# Patient Record
Sex: FEMALE | Race: WHITE
[De-identification: names, ages, dates, MRNs, and addresses within clinical notes are randomized per-mention and may not be internally consistent; named-entity substitution may affect disease eponyms.]

---

## 2017-03-27 ENCOUNTER — HOSPITAL ENCOUNTER (OUTPATIENT)
Dept: HOSPITAL 83 - WOUNDCARE | Age: 47
End: 2017-03-27
Attending: INTERNAL MEDICINE
Payer: MEDICARE

## 2017-03-27 DIAGNOSIS — F32.9: ICD-10-CM

## 2017-03-27 DIAGNOSIS — F41.9: ICD-10-CM

## 2017-03-27 DIAGNOSIS — Q05.9: ICD-10-CM

## 2017-03-27 DIAGNOSIS — Z86.73: ICD-10-CM

## 2017-03-27 DIAGNOSIS — N18.9: ICD-10-CM

## 2017-03-27 DIAGNOSIS — E78.5: ICD-10-CM

## 2017-03-27 DIAGNOSIS — M19.90: ICD-10-CM

## 2017-03-27 DIAGNOSIS — L89.313: ICD-10-CM

## 2017-03-27 DIAGNOSIS — F17.210: ICD-10-CM

## 2017-03-27 DIAGNOSIS — K21.9: ICD-10-CM

## 2017-03-27 DIAGNOSIS — Z99.2: ICD-10-CM

## 2017-03-27 DIAGNOSIS — Z86.718: ICD-10-CM

## 2017-03-27 DIAGNOSIS — L89.323: Primary | ICD-10-CM

## 2017-03-31 ENCOUNTER — HOSPITAL ENCOUNTER (INPATIENT)
Dept: HOSPITAL 83 - ED | Age: 47
LOS: 2 days | Discharge: HOME | DRG: 592 | End: 2017-04-02
Attending: INTERNAL MEDICINE | Admitting: INTERNAL MEDICINE
Payer: MEDICARE

## 2017-03-31 VITALS — DIASTOLIC BLOOD PRESSURE: 71 MMHG | SYSTOLIC BLOOD PRESSURE: 108 MMHG

## 2017-03-31 VITALS — SYSTOLIC BLOOD PRESSURE: 138 MMHG | DIASTOLIC BLOOD PRESSURE: 73 MMHG

## 2017-03-31 VITALS — BODY MASS INDEX: 16.95 KG/M2 | WEIGHT: 73.25 LBS | HEIGHT: 55 IN

## 2017-03-31 VITALS — DIASTOLIC BLOOD PRESSURE: 72 MMHG | SYSTOLIC BLOOD PRESSURE: 122 MMHG

## 2017-03-31 VITALS — DIASTOLIC BLOOD PRESSURE: 73 MMHG

## 2017-03-31 DIAGNOSIS — Z84.89: ICD-10-CM

## 2017-03-31 DIAGNOSIS — E83.39: ICD-10-CM

## 2017-03-31 DIAGNOSIS — Z87.440: ICD-10-CM

## 2017-03-31 DIAGNOSIS — Z86.718: ICD-10-CM

## 2017-03-31 DIAGNOSIS — K21.9: ICD-10-CM

## 2017-03-31 DIAGNOSIS — Z86.73: ICD-10-CM

## 2017-03-31 DIAGNOSIS — M54.9: ICD-10-CM

## 2017-03-31 DIAGNOSIS — E87.5: ICD-10-CM

## 2017-03-31 DIAGNOSIS — D63.1: ICD-10-CM

## 2017-03-31 DIAGNOSIS — Z90.49: ICD-10-CM

## 2017-03-31 DIAGNOSIS — Z82.49: ICD-10-CM

## 2017-03-31 DIAGNOSIS — G89.29: ICD-10-CM

## 2017-03-31 DIAGNOSIS — Z79.82: ICD-10-CM

## 2017-03-31 DIAGNOSIS — Z87.442: ICD-10-CM

## 2017-03-31 DIAGNOSIS — Z88.1: ICD-10-CM

## 2017-03-31 DIAGNOSIS — N18.6: ICD-10-CM

## 2017-03-31 DIAGNOSIS — E43: ICD-10-CM

## 2017-03-31 DIAGNOSIS — Z88.8: ICD-10-CM

## 2017-03-31 DIAGNOSIS — Z91.048: ICD-10-CM

## 2017-03-31 DIAGNOSIS — Z88.5: ICD-10-CM

## 2017-03-31 DIAGNOSIS — Z90.5: ICD-10-CM

## 2017-03-31 DIAGNOSIS — E78.5: ICD-10-CM

## 2017-03-31 DIAGNOSIS — Z99.2: ICD-10-CM

## 2017-03-31 DIAGNOSIS — E87.2: ICD-10-CM

## 2017-03-31 DIAGNOSIS — M19.90: ICD-10-CM

## 2017-03-31 DIAGNOSIS — Z71.6: ICD-10-CM

## 2017-03-31 DIAGNOSIS — Q05.9: ICD-10-CM

## 2017-03-31 DIAGNOSIS — F32.9: ICD-10-CM

## 2017-03-31 DIAGNOSIS — Z91.15: ICD-10-CM

## 2017-03-31 DIAGNOSIS — L89.159: Primary | ICD-10-CM

## 2017-03-31 DIAGNOSIS — F17.210: ICD-10-CM

## 2017-03-31 DIAGNOSIS — Z79.899: ICD-10-CM

## 2017-03-31 DIAGNOSIS — Z83.6: ICD-10-CM

## 2017-03-31 DIAGNOSIS — F41.9: ICD-10-CM

## 2017-03-31 LAB
ALBUMIN SERPL-MCNC: 2 GM/DL (ref 3.1–4.5)
ALP SERPL-CCNC: 102 U/L (ref 45–117)
ALT SERPL W P-5'-P-CCNC: 38 U/L (ref 12–78)
AST SERPL-CCNC: 49 IU/L (ref 3–35)
BASOPHILS # BLD AUTO: 0 10*3/UL (ref 0–0.1)
BASOPHILS NFR BLD AUTO: 0.6 % (ref 0–1)
BUN SERPL-MCNC: 69 MG/DL (ref 7–24)
CHLORIDE SERPL-SCNC: 114 MMOL/L (ref 98–107)
CK MB SERPL-MCNC: 1.7 NG/ML (ref 0.5–3.6)
CK SERPL-CCNC: 32 U/L (ref 26–192)
CO2 SERPL-SCNC: 14 MMOL/L (ref 21–32)
EOSINOPHIL # BLD AUTO: 0.1 10*3/UL (ref 0–0.4)
EOSINOPHIL # BLD AUTO: 1.7 % (ref 1–4)
ERYTHROCYTE [DISTWIDTH] IN BLOOD BY AUTOMATED COUNT: 16.8 % (ref 0–14.5)
GLUCOSE SERPL-MCNC: 140 MG/DL (ref 65–99)
HCT VFR BLD AUTO: 34.8 % (ref 37–47)
HGB BLD-MCNC: 10.7 G/DL (ref 12–16)
IG #: 0.1 10*3/UL (ref 0–0.1)
LYMPHOCYTES # BLD AUTO: 1.1 10*3/UL (ref 1.3–4.4)
LYMPHOCYTES NFR BLD AUTO: 15.2 % (ref 27–41)
MCH RBC QN AUTO: 27.9 PG (ref 27–31)
MCHC RBC AUTO-ENTMCNC: 30.7 G/DL (ref 33–37)
MCV RBC AUTO: 90.6 FL (ref 81–99)
MONOCYTES # BLD AUTO: 0.5 10*3/UL (ref 0.1–1)
MONOCYTES NFR BLD MANUAL: 6.6 % (ref 3–9)
NEUT #: 5.2 10*3/UL (ref 2.3–7.9)
NEUT %: 75.2 % (ref 47–73)
NRBC BLD QL AUTO: 0 10*3/UL (ref 0–0)
PLATELET # BLD AUTO: 187 10*3/UL (ref 130–400)
PMV BLD AUTO: 10.1 FL (ref 9.6–12.3)
POTASSIUM SERPL-SCNC: 5.6 MMOL/L (ref 3.5–5.1)
PROT SERPL-MCNC: 5.6 GM/DL (ref 6.4–8.2)
RBC # BLD AUTO: 3.84 10*6/UL (ref 4.1–5.1)
SODIUM SERPL-SCNC: 144 MMOL/L (ref 136–145)
TROPONIN I SERPL-MCNC: < 0.015 NG/ML (ref ?–0.04)
WBC NRBC COR # BLD AUTO: 7 10*3/UL (ref 4.8–10.8)

## 2017-04-01 VITALS — DIASTOLIC BLOOD PRESSURE: 63 MMHG | SYSTOLIC BLOOD PRESSURE: 114 MMHG

## 2017-04-01 VITALS — SYSTOLIC BLOOD PRESSURE: 136 MMHG | DIASTOLIC BLOOD PRESSURE: 72 MMHG

## 2017-04-01 VITALS — SYSTOLIC BLOOD PRESSURE: 126 MMHG | DIASTOLIC BLOOD PRESSURE: 76 MMHG

## 2017-04-01 VITALS — DIASTOLIC BLOOD PRESSURE: 77 MMHG

## 2017-04-01 VITALS — DIASTOLIC BLOOD PRESSURE: 87 MMHG | SYSTOLIC BLOOD PRESSURE: 141 MMHG

## 2017-04-01 LAB
25(OH)D3 SERPL-MCNC: 63 NG/ML (ref 30–100)
ALBUMIN SERPL-MCNC: 2.1 GM/DL (ref 3.1–4.5)
ALP SERPL-CCNC: 98 U/L (ref 45–117)
ALT SERPL W P-5'-P-CCNC: 38 U/L (ref 12–78)
AST SERPL-CCNC: 46 IU/L (ref 3–35)
BASOPHILS # BLD AUTO: 0 10*3/UL (ref 0–0.1)
BASOPHILS NFR BLD AUTO: 0.7 % (ref 0–1)
BUN SERPL-MCNC: 68 MG/DL (ref 7–24)
CHLORIDE SERPL-SCNC: 115 MMOL/L (ref 98–107)
CHOLEST SERPL-MCNC: 123 MG/DL (ref ?–200)
CK MB SERPL-MCNC: 1.5 NG/ML (ref 0.5–3.6)
CK MB SERPL-MCNC: 1.5 NG/ML (ref 0.5–3.6)
CK SERPL-CCNC: 24 U/L (ref 26–192)
CK SERPL-CCNC: 28 U/L (ref 26–192)
CO2 SERPL-SCNC: 14 MMOL/L (ref 21–32)
EOSINOPHIL # BLD AUTO: 0.2 10*3/UL (ref 0–0.4)
EOSINOPHIL # BLD AUTO: 2.9 % (ref 1–4)
ERYTHROCYTE [DISTWIDTH] IN BLOOD BY AUTOMATED COUNT: 17.1 % (ref 0–14.5)
EST. AVERAGE GLUCOSE BLD GHB EST-MCNC: 80 MG/DL
FOLATE SERPL-MCNC: 21.77 NG/ML (ref 5.38–?)
GLUCOSE SERPL-MCNC: 74 MG/DL (ref 65–99)
HCT VFR BLD AUTO: 35.8 % (ref 37–47)
HDLC SERPL-MCNC: 49 MG/DL (ref 40–60)
HGB BLD-MCNC: 10.9 G/DL (ref 12–16)
IG #: 0 10*3/UL (ref 0–0.1)
INR BLD: 1.1 (ref 2–3.5)
LDLC SERPL DIRECT ASSAY-MCNC: 55 MG/DL (ref 9–159)
LYMPHOCYTES # BLD AUTO: 1.3 10*3/UL (ref 1.3–4.4)
LYMPHOCYTES NFR BLD AUTO: 22 % (ref 27–41)
MAGNESIUM SERPL-MCNC: 1.9 MG/DL (ref 1.5–2.1)
MCH RBC QN AUTO: 27.9 PG (ref 27–31)
MCHC RBC AUTO-ENTMCNC: 30.4 G/DL (ref 33–37)
MCV RBC AUTO: 91.6 FL (ref 81–99)
MONOCYTES # BLD AUTO: 0.5 10*3/UL (ref 0.1–1)
MONOCYTES NFR BLD MANUAL: 7.8 % (ref 3–9)
NEUT #: 3.9 10*3/UL (ref 2.3–7.9)
NEUT %: 66.3 % (ref 47–73)
NRBC BLD QL AUTO: 0 % (ref 0–0)
PHOSPHATE SERPL-MCNC: 6 MG/DL (ref 2.5–4.9)
PLATELET # BLD AUTO: 199 10*3/UL (ref 130–400)
PMV BLD AUTO: 10.4 FL (ref 9.6–12.3)
POTASSIUM SERPL-SCNC: 5.7 MMOL/L (ref 3.5–5.1)
PROT SERPL-MCNC: 5.4 GM/DL (ref 6.4–8.2)
PROTHROMBIN TIME: 11.4 SECONDS (ref 9–12.4)
RBC # BLD AUTO: 3.91 10*6/UL (ref 4.1–5.1)
SODIUM SERPL-SCNC: 143 MMOL/L (ref 136–145)
T4 FREE SERPL-MCNC: 0.91 NG/DL (ref 0.76–1.46)
TRIGL SERPL-MCNC: 95 MG/DL (ref ?–150)
TROPONIN I SERPL-MCNC: < 0.015 NG/ML (ref ?–0.04)
TROPONIN I SERPL-MCNC: < 0.015 NG/ML (ref ?–0.04)
TSH SERPL DL<=0.005 MIU/L-ACNC: 2.68 UIU/ML (ref 0.36–4.75)
VITAMIN B12: 661 PG/ML (ref 247–911)
VLDLC SERPL CALC-MCNC: 19 MG/DL (ref 6–40)
WBC NRBC COR # BLD AUTO: 5.9 10*3/UL (ref 4.8–10.8)

## 2017-04-02 VITALS — DIASTOLIC BLOOD PRESSURE: 77 MMHG | SYSTOLIC BLOOD PRESSURE: 124 MMHG

## 2017-04-02 VITALS — DIASTOLIC BLOOD PRESSURE: 76 MMHG | SYSTOLIC BLOOD PRESSURE: 126 MMHG

## 2017-04-02 VITALS — DIASTOLIC BLOOD PRESSURE: 62 MMHG

## 2017-04-02 VITALS — DIASTOLIC BLOOD PRESSURE: 76 MMHG

## 2017-04-11 ENCOUNTER — HOSPITAL ENCOUNTER (OUTPATIENT)
Dept: HOSPITAL 83 - WOUNDCARE | Age: 47
Discharge: HOME | End: 2017-04-11
Attending: INTERNAL MEDICINE
Payer: MEDICARE

## 2017-04-11 DIAGNOSIS — Q05.9: ICD-10-CM

## 2017-04-11 DIAGNOSIS — N18.5: Primary | ICD-10-CM

## 2017-04-11 DIAGNOSIS — L03.90: ICD-10-CM

## 2017-04-11 DIAGNOSIS — L89.309: ICD-10-CM

## 2017-04-11 LAB
ALBUMIN SERPL-MCNC: 2.2 GM/DL (ref 3.1–4.5)
ALP SERPL-CCNC: 102 U/L (ref 45–117)
ALT SERPL W P-5'-P-CCNC: 31 U/L (ref 12–78)
AST SERPL-CCNC: 39 IU/L (ref 3–35)
BASOPHILS # BLD AUTO: 0.1 10*3/UL (ref 0–0.1)
BASOPHILS NFR BLD AUTO: 0.9 % (ref 0–1)
BUN SERPL-MCNC: 73 MG/DL (ref 7–24)
CHLORIDE SERPL-SCNC: 116 MMOL/L (ref 98–107)
CO2 SERPL-SCNC: 15 MMOL/L (ref 21–32)
EOSINOPHIL # BLD AUTO: 0.5 10*3/UL (ref 0–0.4)
EOSINOPHIL # BLD AUTO: 6.6 % (ref 1–4)
ERYTHROCYTE [DISTWIDTH] IN BLOOD BY AUTOMATED COUNT: 16.8 % (ref 0–14.5)
GLUCOSE SERPL-MCNC: 81 MG/DL (ref 65–99)
HCT VFR BLD AUTO: 35.7 % (ref 37–47)
HGB BLD-MCNC: 11.1 G/DL (ref 12–16)
IG #: 0 10*3/UL (ref 0–0.1)
LYMPHOCYTES # BLD AUTO: 1.1 10*3/UL (ref 1.3–4.4)
LYMPHOCYTES NFR BLD AUTO: 14.2 % (ref 27–41)
MCH RBC QN AUTO: 27.8 PG (ref 27–31)
MCHC RBC AUTO-ENTMCNC: 31.1 G/DL (ref 33–37)
MCV RBC AUTO: 89.5 FL (ref 81–99)
MONOCYTES # BLD AUTO: 0.4 10*3/UL (ref 0.1–1)
MONOCYTES NFR BLD MANUAL: 5.2 % (ref 3–9)
NEUT #: 5.6 10*3/UL (ref 2.3–7.9)
NEUT %: 72.7 % (ref 47–73)
NRBC BLD QL AUTO: 0 % (ref 0–0)
PLATELET # BLD AUTO: 150 10*3/UL (ref 130–400)
PMV BLD AUTO: 11.9 FL (ref 9.6–12.3)
POTASSIUM SERPL-SCNC: 5.7 MMOL/L (ref 3.5–5.1)
PREALB SERPL-MCNC: 25 MG/DL (ref 20–40)
PROT SERPL-MCNC: 5.7 GM/DL (ref 6.4–8.2)
RBC # BLD AUTO: 3.99 10*6/UL (ref 4.1–5.1)
SODIUM SERPL-SCNC: 141 MMOL/L (ref 136–145)
WBC NRBC COR # BLD AUTO: 7.7 10*3/UL (ref 4.8–10.8)

## 2017-05-03 ENCOUNTER — HOSPITAL ENCOUNTER (OUTPATIENT)
Dept: HOSPITAL 83 - WOUNDCARE | Age: 47
End: 2017-05-03
Attending: INTERNAL MEDICINE
Payer: MEDICARE

## 2017-05-03 DIAGNOSIS — L89.322: Primary | ICD-10-CM

## 2017-05-03 DIAGNOSIS — Q05.9: ICD-10-CM

## 2017-05-03 DIAGNOSIS — I48.91: ICD-10-CM

## 2017-05-03 DIAGNOSIS — L89.313: ICD-10-CM

## 2017-05-10 ENCOUNTER — HOSPITAL ENCOUNTER (OUTPATIENT)
Dept: HOSPITAL 83 - WOUNDCARE | Age: 47
End: 2017-05-10
Attending: INTERNAL MEDICINE
Payer: MEDICARE

## 2017-05-10 DIAGNOSIS — Q05.9: ICD-10-CM

## 2017-05-10 DIAGNOSIS — L89.323: Primary | ICD-10-CM

## 2017-05-11 ENCOUNTER — HOSPITAL ENCOUNTER (EMERGENCY)
Dept: HOSPITAL 83 - ED | Age: 47
Discharge: LEFT BEFORE DISCHARGE | End: 2017-05-11
Payer: MEDICARE

## 2017-05-11 VITALS
WEIGHT: 66 LBS | DIASTOLIC BLOOD PRESSURE: 78 MMHG | HEIGHT: 55 IN | SYSTOLIC BLOOD PRESSURE: 158 MMHG | BODY MASS INDEX: 15.28 KG/M2

## 2017-05-11 DIAGNOSIS — Z88.1: ICD-10-CM

## 2017-05-11 DIAGNOSIS — Z79.899: ICD-10-CM

## 2017-05-11 DIAGNOSIS — Z90.49: ICD-10-CM

## 2017-05-11 DIAGNOSIS — F17.200: ICD-10-CM

## 2017-05-11 DIAGNOSIS — Z88.8: ICD-10-CM

## 2017-05-11 DIAGNOSIS — E78.5: ICD-10-CM

## 2017-05-11 DIAGNOSIS — M19.90: ICD-10-CM

## 2017-05-11 DIAGNOSIS — Z86.73: ICD-10-CM

## 2017-05-11 DIAGNOSIS — F41.8: ICD-10-CM

## 2017-05-11 DIAGNOSIS — Z79.82: ICD-10-CM

## 2017-05-11 DIAGNOSIS — Z86.718: ICD-10-CM

## 2017-05-11 DIAGNOSIS — Z99.2: ICD-10-CM

## 2017-05-11 DIAGNOSIS — N18.6: Primary | ICD-10-CM

## 2017-05-11 DIAGNOSIS — K21.9: ICD-10-CM

## 2017-05-11 DIAGNOSIS — E87.2: ICD-10-CM

## 2017-05-11 LAB
ALBUMIN SERPL-MCNC: 2.6 GM/DL (ref 3.1–4.5)
ALP SERPL-CCNC: 185 U/L (ref 45–117)
ALT SERPL W P-5'-P-CCNC: 35 U/L (ref 12–78)
AST SERPL-CCNC: 29 IU/L (ref 3–35)
BASOPHILS # BLD AUTO: 0 10*3/UL (ref 0–0.1)
BASOPHILS NFR BLD AUTO: 0.5 % (ref 0–1)
BUN SERPL-MCNC: 131 MG/DL (ref 7–24)
CHLORIDE SERPL-SCNC: 123 MMOL/L (ref 98–107)
CK MB SERPL-MCNC: 2.5 NG/ML (ref 0.5–3.6)
CK SERPL-CCNC: 33 U/L (ref 26–192)
CO2 SERPL-SCNC: 8 MMOL/L (ref 21–32)
CRP SERPL-MCNC: 0.31 MG/DL (ref 0–0.3)
EOSINOPHIL # BLD AUTO: 0.1 10*3/UL (ref 0–0.4)
EOSINOPHIL # BLD AUTO: 1.4 % (ref 1–4)
ERYTHROCYTE [DISTWIDTH] IN BLOOD BY AUTOMATED COUNT: 19.4 % (ref 0–14.5)
GLUCOSE SERPL-MCNC: 113 MG/DL (ref 65–99)
HCT VFR BLD AUTO: 30.4 % (ref 37–47)
HGB BLD-MCNC: 9.4 G/DL (ref 12–16)
IG #: 0 10*3/UL (ref 0–0.1)
INR BLD: 1 (ref 2–3.5)
LYMPHOCYTES # BLD AUTO: 1 10*3/UL (ref 1.3–4.4)
LYMPHOCYTES NFR BLD AUTO: 11.8 % (ref 27–41)
MAGNESIUM SERPL-MCNC: 2.2 MG/DL (ref 1.5–2.1)
MCH RBC QN AUTO: 28 PG (ref 27–31)
MCHC RBC AUTO-ENTMCNC: 30.9 G/DL (ref 33–37)
MCV RBC AUTO: 90.5 FL (ref 81–99)
MONOCYTES # BLD AUTO: 0.4 10*3/UL (ref 0.1–1)
MONOCYTES NFR BLD MANUAL: 4.6 % (ref 3–9)
MYOGLOBIN SERPL-MCNC: 70 NG/ML (ref 13–71)
NEUT #: 6.9 10*3/UL (ref 2.3–7.9)
NEUT %: 81.5 % (ref 47–73)
NRBC BLD QL AUTO: 0 % (ref 0–0)
PLATELET # BLD AUTO: 169 10*3/UL (ref 130–400)
PMV BLD AUTO: 11 FL (ref 9.6–12.3)
POTASSIUM SERPL-SCNC: 5.1 MMOL/L (ref 3.5–5.1)
PROT SERPL-MCNC: 6 GM/DL (ref 6.4–8.2)
PROTHROMBIN TIME: 10.8 SECONDS (ref 9–12.4)
RBC # BLD AUTO: 3.36 10*6/UL (ref 4.1–5.1)
SODIUM SERPL-SCNC: 147 MMOL/L (ref 136–145)
TROPONIN I SERPL-MCNC: < 0.015 NG/ML (ref ?–0.04)
WBC NRBC COR # BLD AUTO: 8.5 10*3/UL (ref 4.8–10.8)

## 2017-05-12 ENCOUNTER — HOSPITAL ENCOUNTER (INPATIENT)
Dept: HOSPITAL 83 - ED | Age: 47
LOS: 9 days | Discharge: LEFT BEFORE DISCHARGE | DRG: 871 | End: 2017-05-21
Attending: INTERNAL MEDICINE | Admitting: INTERNAL MEDICINE
Payer: MEDICARE

## 2017-05-12 VITALS — SYSTOLIC BLOOD PRESSURE: 154 MMHG | DIASTOLIC BLOOD PRESSURE: 85 MMHG

## 2017-05-12 VITALS — WEIGHT: 61 LBS | BODY MASS INDEX: 14.12 KG/M2 | HEIGHT: 55 IN

## 2017-05-12 VITALS — DIASTOLIC BLOOD PRESSURE: 82 MMHG

## 2017-05-12 DIAGNOSIS — Z90.5: ICD-10-CM

## 2017-05-12 DIAGNOSIS — Z51.5: ICD-10-CM

## 2017-05-12 DIAGNOSIS — Z71.6: ICD-10-CM

## 2017-05-12 DIAGNOSIS — F33.2: ICD-10-CM

## 2017-05-12 DIAGNOSIS — E87.0: ICD-10-CM

## 2017-05-12 DIAGNOSIS — Z91.15: ICD-10-CM

## 2017-05-12 DIAGNOSIS — K21.9: ICD-10-CM

## 2017-05-12 DIAGNOSIS — F41.9: ICD-10-CM

## 2017-05-12 DIAGNOSIS — L89.153: ICD-10-CM

## 2017-05-12 DIAGNOSIS — B96.1: ICD-10-CM

## 2017-05-12 DIAGNOSIS — Z84.89: ICD-10-CM

## 2017-05-12 DIAGNOSIS — Z86.718: ICD-10-CM

## 2017-05-12 DIAGNOSIS — N18.6: ICD-10-CM

## 2017-05-12 DIAGNOSIS — G93.41: ICD-10-CM

## 2017-05-12 DIAGNOSIS — M54.5: ICD-10-CM

## 2017-05-12 DIAGNOSIS — A41.9: Primary | ICD-10-CM

## 2017-05-12 DIAGNOSIS — Z88.8: ICD-10-CM

## 2017-05-12 DIAGNOSIS — Z16.29: ICD-10-CM

## 2017-05-12 DIAGNOSIS — G44.209: ICD-10-CM

## 2017-05-12 DIAGNOSIS — Z88.6: ICD-10-CM

## 2017-05-12 DIAGNOSIS — Z79.899: ICD-10-CM

## 2017-05-12 DIAGNOSIS — Z99.2: ICD-10-CM

## 2017-05-12 DIAGNOSIS — M19.90: ICD-10-CM

## 2017-05-12 DIAGNOSIS — Z91.048: ICD-10-CM

## 2017-05-12 DIAGNOSIS — Z88.1: ICD-10-CM

## 2017-05-12 DIAGNOSIS — L89.302: ICD-10-CM

## 2017-05-12 DIAGNOSIS — Z86.73: ICD-10-CM

## 2017-05-12 DIAGNOSIS — Z53.21: ICD-10-CM

## 2017-05-12 DIAGNOSIS — F17.210: ICD-10-CM

## 2017-05-12 DIAGNOSIS — Z90.49: ICD-10-CM

## 2017-05-12 DIAGNOSIS — Z82.0: ICD-10-CM

## 2017-05-12 DIAGNOSIS — D63.1: ICD-10-CM

## 2017-05-12 DIAGNOSIS — Z82.5: ICD-10-CM

## 2017-05-12 DIAGNOSIS — Z79.82: ICD-10-CM

## 2017-05-12 DIAGNOSIS — R65.20: ICD-10-CM

## 2017-05-12 DIAGNOSIS — Z93.3: ICD-10-CM

## 2017-05-12 DIAGNOSIS — Z82.49: ICD-10-CM

## 2017-05-12 DIAGNOSIS — D68.59: ICD-10-CM

## 2017-05-12 DIAGNOSIS — N39.0: ICD-10-CM

## 2017-05-12 LAB
ALBUMIN SERPL-MCNC: (no result) G/DL
APPEARANCE UR: (no result)
BACTERIA #/AREA URNS HPF: (no result) /[HPF]
BASE EXCESS BLDA CALC-SCNC: -20 MMOL/L (ref -2–2)
BILIRUB UR QL STRIP: NEGATIVE
BODY TEMPERATURE: 98.4 F (ref 98–99)
COLOR UR: YELLOW
GLUCOSE UR QL: NEGATIVE
HCO3 BLDA-SCNC: 8.1 MMOL/L (ref 22–26)
HGB UR QL STRIP: (no result)
KETONES UR QL STRIP: NEGATIVE
LEUKOCYTE ESTERASE UR QL STRIP: NEGATIVE
NITRITE UR QL STRIP: NEGATIVE
PCO2 BLDA: 8.9 MMOL/L (ref 23–27)
PH BLDA: 7.11 [PH] (ref 7.35–7.45)
PH UR STRIP: 6 [PH] (ref 5–9)
PO2 BLDA: 37.5 MMHG (ref 80–90)
RBC #/AREA URNS HPF: (no result) RBC/HPF (ref 0–2)
SP GR UR: 1.01 (ref 1–1.03)
URINE REFLEX COMMENT: YES
UROBILINOGEN UR STRIP-MCNC: 0.2 E.U./DL (ref 0.2–1)
WBC #/AREA URNS HPF: (no result) WBC/HPF (ref 0–5)

## 2017-05-13 VITALS — SYSTOLIC BLOOD PRESSURE: 159 MMHG | DIASTOLIC BLOOD PRESSURE: 92 MMHG

## 2017-05-13 VITALS — SYSTOLIC BLOOD PRESSURE: 138 MMHG | DIASTOLIC BLOOD PRESSURE: 74 MMHG

## 2017-05-13 VITALS — DIASTOLIC BLOOD PRESSURE: 88 MMHG

## 2017-05-13 VITALS — DIASTOLIC BLOOD PRESSURE: 77 MMHG | SYSTOLIC BLOOD PRESSURE: 143 MMHG

## 2017-05-13 VITALS — DIASTOLIC BLOOD PRESSURE: 92 MMHG

## 2017-05-13 VITALS — DIASTOLIC BLOOD PRESSURE: 80 MMHG

## 2017-05-13 VITALS — SYSTOLIC BLOOD PRESSURE: 147 MMHG | DIASTOLIC BLOOD PRESSURE: 91 MMHG

## 2017-05-13 VITALS — DIASTOLIC BLOOD PRESSURE: 73 MMHG | SYSTOLIC BLOOD PRESSURE: 121 MMHG

## 2017-05-13 VITALS — SYSTOLIC BLOOD PRESSURE: 141 MMHG | DIASTOLIC BLOOD PRESSURE: 82 MMHG

## 2017-05-13 LAB
25(OH)D3 SERPL-MCNC: 32.1 NG/ML (ref 30–100)
25(OH)D3 SERPL-MCNC: 36.1 NG/ML (ref 30–100)
ALBUMIN SERPL-MCNC: 2.5 GM/DL (ref 3.1–4.5)
ALBUMIN SERPL-MCNC: 2.6 GM/DL (ref 3.1–4.5)
ALP SERPL-CCNC: 128 U/L (ref 45–117)
ALP SERPL-CCNC: 129 U/L (ref 45–117)
ALT SERPL W P-5'-P-CCNC: 28 U/L (ref 12–78)
ALT SERPL W P-5'-P-CCNC: 30 U/L (ref 12–78)
AST SERPL-CCNC: 22 IU/L (ref 3–35)
AST SERPL-CCNC: 32 IU/L (ref 3–35)
BASOPHILS # BLD AUTO: 0 10*3/UL (ref 0–0.1)
BASOPHILS # BLD AUTO: 0 10*3/UL (ref 0–0.1)
BASOPHILS NFR BLD AUTO: 0.5 % (ref 0–1)
BASOPHILS NFR BLD AUTO: 0.5 % (ref 0–1)
BUN SERPL-MCNC: 130 MG/DL (ref 7–24)
BUN SERPL-MCNC: 132 MG/DL (ref 7–24)
CHLORIDE SERPL-SCNC: 120 MMOL/L (ref 98–107)
CHLORIDE SERPL-SCNC: 120 MMOL/L (ref 98–107)
CHOLEST SERPL-MCNC: 139 MG/DL (ref ?–200)
CHOLEST SERPL-MCNC: 141 MG/DL (ref ?–200)
CO2 SERPL-SCNC: 7 MMOL/L (ref 21–32)
CO2 SERPL-SCNC: 8 MMOL/L (ref 21–32)
EOSINOPHIL # BLD AUTO: 0.1 10*3/UL (ref 0–0.4)
EOSINOPHIL # BLD AUTO: 0.2 10*3/UL (ref 0–0.4)
EOSINOPHIL # BLD AUTO: 1.4 % (ref 1–4)
EOSINOPHIL # BLD AUTO: 2 % (ref 1–4)
ERYTHROCYTE [DISTWIDTH] IN BLOOD BY AUTOMATED COUNT: 19.3 % (ref 0–14.5)
ERYTHROCYTE [DISTWIDTH] IN BLOOD BY AUTOMATED COUNT: 19.5 % (ref 0–14.5)
EST. AVERAGE GLUCOSE BLD GHB EST-MCNC: 54 MG/DL
EST. AVERAGE GLUCOSE BLD GHB EST-MCNC: 88 MG/DL
FOLATE SERPL-MCNC: 16.83 NG/ML (ref 5.38–?)
FOLATE SERPL-MCNC: 20.81 NG/ML (ref 5.38–?)
GLUCOSE SERPL-MCNC: 78 MG/DL (ref 65–99)
GLUCOSE SERPL-MCNC: 86 MG/DL (ref 65–99)
HCT VFR BLD AUTO: 25.7 % (ref 37–47)
HCT VFR BLD AUTO: 29.1 % (ref 37–47)
HDLC SERPL-MCNC: 51 MG/DL (ref 40–60)
HDLC SERPL-MCNC: 56 MG/DL (ref 40–60)
HGB BLD-MCNC: 8.3 G/DL (ref 12–16)
HGB BLD-MCNC: 9.1 G/DL (ref 12–16)
IG #: 0 10*3/UL (ref 0–0.1)
IG #: 0 10*3/UL (ref 0–0.1)
LDLC SERPL DIRECT ASSAY-MCNC: 57 MG/DL (ref 9–159)
LDLC SERPL DIRECT ASSAY-MCNC: 61 MG/DL (ref 9–159)
LYMPHOCYTES # BLD AUTO: 1.6 10*3/UL (ref 1.3–4.4)
LYMPHOCYTES # BLD AUTO: 1.8 10*3/UL (ref 1.3–4.4)
LYMPHOCYTES NFR BLD AUTO: 20.1 % (ref 27–41)
LYMPHOCYTES NFR BLD AUTO: 21.3 % (ref 27–41)
MAGNESIUM SERPL-MCNC: 2 MG/DL (ref 1.5–2.1)
MAGNESIUM SERPL-MCNC: 2.1 MG/DL (ref 1.5–2.1)
MCH RBC QN AUTO: 27.7 PG (ref 27–31)
MCH RBC QN AUTO: 28 PG (ref 27–31)
MCHC RBC AUTO-ENTMCNC: 31.3 G/DL (ref 33–37)
MCHC RBC AUTO-ENTMCNC: 32.3 G/DL (ref 33–37)
MCV RBC AUTO: 86.8 FL (ref 81–99)
MCV RBC AUTO: 88.4 FL (ref 81–99)
MONOCYTES # BLD AUTO: 0.3 10*3/UL (ref 0.1–1)
MONOCYTES # BLD AUTO: 0.5 10*3/UL (ref 0.1–1)
MONOCYTES NFR BLD MANUAL: 4.3 % (ref 3–9)
MONOCYTES NFR BLD MANUAL: 5.5 % (ref 3–9)
NEUT #: 5.8 10*3/UL (ref 2.3–7.9)
NEUT #: 6.1 10*3/UL (ref 2.3–7.9)
NEUT %: 71 % (ref 47–73)
NEUT %: 72.8 % (ref 47–73)
NRBC BLD QL AUTO: 0 % (ref 0–0)
NRBC BLD QL AUTO: 0 % (ref 0–0)
PHOSPHATE SERPL-MCNC: 6.5 MG/DL (ref 2.5–4.9)
PHOSPHATE SERPL-MCNC: 7.1 MG/DL (ref 2.5–4.9)
PLATELET # BLD AUTO: 150 10*3/UL (ref 130–400)
PLATELET # BLD AUTO: 154 10*3/UL (ref 130–400)
PMV BLD AUTO: 10.3 FL (ref 9.6–12.3)
PMV BLD AUTO: 10.3 FL (ref 9.6–12.3)
POTASSIUM SERPL-SCNC: 4.9 MMOL/L (ref 3.5–5.1)
POTASSIUM SERPL-SCNC: 5.1 MMOL/L (ref 3.5–5.1)
PROT SERPL-MCNC: 5.5 GM/DL (ref 6.4–8.2)
PROT SERPL-MCNC: 5.9 GM/DL (ref 6.4–8.2)
RBC # BLD AUTO: 2.96 10*6/UL (ref 4.1–5.1)
RBC # BLD AUTO: 3.29 10*6/UL (ref 4.1–5.1)
SODIUM SERPL-SCNC: 142 MMOL/L (ref 136–145)
SODIUM SERPL-SCNC: 144 MMOL/L (ref 136–145)
TRIGL SERPL-MCNC: 130 MG/DL (ref ?–150)
TRIGL SERPL-MCNC: 144 MG/DL (ref ?–150)
TSH SERPL DL<=0.005 MIU/L-ACNC: 2.11 UIU/ML (ref 0.36–4.75)
TSH SERPL DL<=0.005 MIU/L-ACNC: 2.12 UIU/ML (ref 0.36–4.75)
VITAMIN B12: 632 PG/ML (ref 247–911)
VITAMIN B12: 713 PG/ML (ref 247–911)
VLDLC SERPL CALC-MCNC: 26 MG/DL (ref 6–40)
VLDLC SERPL CALC-MCNC: 29 MG/DL (ref 6–40)
WBC NRBC COR # BLD AUTO: 8 10*3/UL (ref 4.8–10.8)
WBC NRBC COR # BLD AUTO: 8.6 10*3/UL (ref 4.8–10.8)

## 2017-05-14 VITALS — DIASTOLIC BLOOD PRESSURE: 58 MMHG

## 2017-05-14 VITALS — DIASTOLIC BLOOD PRESSURE: 69 MMHG | SYSTOLIC BLOOD PRESSURE: 110 MMHG

## 2017-05-14 VITALS — DIASTOLIC BLOOD PRESSURE: 61 MMHG | SYSTOLIC BLOOD PRESSURE: 91 MMHG

## 2017-05-14 VITALS — SYSTOLIC BLOOD PRESSURE: 109 MMHG | DIASTOLIC BLOOD PRESSURE: 65 MMHG

## 2017-05-14 VITALS — SYSTOLIC BLOOD PRESSURE: 112 MMHG | DIASTOLIC BLOOD PRESSURE: 62 MMHG

## 2017-05-14 VITALS — DIASTOLIC BLOOD PRESSURE: 67 MMHG | SYSTOLIC BLOOD PRESSURE: 105 MMHG

## 2017-05-14 LAB
ALBUMIN SERPL-MCNC: 2.7 GM/DL (ref 3.1–4.5)
ALP SERPL-CCNC: 130 U/L (ref 45–117)
ALT SERPL W P-5'-P-CCNC: 32 U/L (ref 12–78)
AST SERPL-CCNC: 32 IU/L (ref 3–35)
BASOPHILS # BLD AUTO: 0 10*3/UL (ref 0–0.1)
BASOPHILS NFR BLD AUTO: 0.3 % (ref 0–1)
BUN SERPL-MCNC: 140 MG/DL (ref 7–24)
CHLORIDE SERPL-SCNC: 120 MMOL/L (ref 98–107)
CO2 SERPL-SCNC: 6 MMOL/L (ref 21–32)
EOSINOPHIL # BLD AUTO: 0 % (ref 1–4)
EOSINOPHIL # BLD AUTO: 0 10*3/UL (ref 0–0.4)
ERYTHROCYTE [DISTWIDTH] IN BLOOD BY AUTOMATED COUNT: 19.5 % (ref 0–14.5)
GLUCOSE SERPL-MCNC: 64 MG/DL (ref 65–99)
HCT VFR BLD AUTO: 28.9 % (ref 37–47)
HGB BLD-MCNC: 8.8 G/DL (ref 12–16)
IG #: 0.2 10*3/UL (ref 0–0.1)
LYMPHOCYTES # BLD AUTO: 1.1 10*3/UL (ref 1.3–4.4)
LYMPHOCYTES NFR BLD AUTO: 8.6 % (ref 27–41)
MCH RBC QN AUTO: 27.9 PG (ref 27–31)
MCHC RBC AUTO-ENTMCNC: 30.4 G/DL (ref 33–37)
MCV RBC AUTO: 91.7 FL (ref 81–99)
MONOCYTES # BLD AUTO: 0.5 10*3/UL (ref 0.1–1)
MONOCYTES NFR BLD MANUAL: 3.6 % (ref 3–9)
NEUT #: 11.1 10*3/UL (ref 2.3–7.9)
NEUT %: 86 % (ref 47–73)
NRBC BLD QL AUTO: 0 10*3/UL (ref 0–0)
PLATELET # BLD AUTO: 154 10*3/UL (ref 130–400)
PMV BLD AUTO: 11.5 FL (ref 9.6–12.3)
POTASSIUM SERPL-SCNC: 5.9 MMOL/L (ref 3.5–5.1)
PROT SERPL-MCNC: 6 GM/DL (ref 6.4–8.2)
RBC # BLD AUTO: 3.15 10*6/UL (ref 4.1–5.1)
SODIUM SERPL-SCNC: 147 MMOL/L (ref 136–145)
WBC NRBC COR # BLD AUTO: 12.9 10*3/UL (ref 4.8–10.8)

## 2017-05-15 VITALS — SYSTOLIC BLOOD PRESSURE: 111 MMHG | DIASTOLIC BLOOD PRESSURE: 60 MMHG

## 2017-05-15 VITALS — SYSTOLIC BLOOD PRESSURE: 121 MMHG | DIASTOLIC BLOOD PRESSURE: 64 MMHG

## 2017-05-15 VITALS — SYSTOLIC BLOOD PRESSURE: 76 MMHG | DIASTOLIC BLOOD PRESSURE: 38 MMHG

## 2017-05-15 VITALS — DIASTOLIC BLOOD PRESSURE: 53 MMHG

## 2017-05-15 VITALS — DIASTOLIC BLOOD PRESSURE: 54 MMHG

## 2017-05-15 VITALS — DIASTOLIC BLOOD PRESSURE: 55 MMHG | SYSTOLIC BLOOD PRESSURE: 94 MMHG

## 2017-05-15 VITALS — DIASTOLIC BLOOD PRESSURE: 52 MMHG

## 2017-05-15 LAB
ALBUMIN SERPL-MCNC: 2.6 GM/DL (ref 3.1–4.5)
ALP SERPL-CCNC: 106 U/L (ref 45–117)
ALT SERPL W P-5'-P-CCNC: 28 U/L (ref 12–78)
AST SERPL-CCNC: 31 IU/L (ref 3–35)
BASE EXCESS BLDA CALC-SCNC: 1.8 MMOL/L (ref -2–2)
BASOPHILS # BLD AUTO: 0 10*3/UL (ref 0–0.1)
BASOPHILS NFR BLD AUTO: 0.2 % (ref 0–1)
BODY TEMPERATURE: 97.8 F (ref 98–99)
BUN SERPL-MCNC: 146 MG/DL (ref 7–24)
CHLORIDE SERPL-SCNC: 121 MMOL/L (ref 98–107)
CO2 SERPL-SCNC: 9 MMOL/L (ref 21–32)
EOSINOPHIL # BLD AUTO: 0 % (ref 1–4)
EOSINOPHIL # BLD AUTO: 0 10*3/UL (ref 0–0.4)
ERYTHROCYTE [DISTWIDTH] IN BLOOD BY AUTOMATED COUNT: 19.5 % (ref 0–14.5)
GLUCOSE SERPL-MCNC: 90 MG/DL (ref 65–99)
HCO3 BLDA-SCNC: 24.2 MMOL/L (ref 22–26)
HCT VFR BLD AUTO: 25.6 % (ref 37–47)
HGB BLD-MCNC: 8.1 G/DL (ref 12–16)
IG #: 0.1 10*3/UL (ref 0–0.1)
INR BLD: 1 (ref 2–3.5)
LYMPHOCYTES # BLD AUTO: 0.8 10*3/UL (ref 1.3–4.4)
LYMPHOCYTES NFR BLD AUTO: 6.4 % (ref 27–41)
MCH RBC QN AUTO: 28.1 PG (ref 27–31)
MCHC RBC AUTO-ENTMCNC: 31.6 G/DL (ref 33–37)
MCV RBC AUTO: 88.9 FL (ref 81–99)
MONOCYTES # BLD AUTO: 0.7 10*3/UL (ref 0.1–1)
MONOCYTES NFR BLD MANUAL: 6.2 % (ref 3–9)
NEUT #: 10.1 10*3/UL (ref 2.3–7.9)
NEUT %: 86.5 % (ref 47–73)
NRBC BLD QL AUTO: 0 % (ref 0–0)
PCO2 BLDA: 25.2 MMOL/L (ref 23–27)
PH BLDA: 7.51 [PH] (ref 7.35–7.45)
PLATELET # BLD AUTO: 146 10*3/UL (ref 130–400)
PMV BLD AUTO: 11 FL (ref 9.6–12.3)
PO2 BLDA: 130 MMHG (ref 80–90)
POTASSIUM SERPL-SCNC: 4.6 MMOL/L (ref 3.5–5.1)
PROT SERPL-MCNC: 5.5 GM/DL (ref 6.4–8.2)
PROTHROMBIN TIME: 10.8 SECONDS (ref 9–12.4)
RBC # BLD AUTO: 2.88 10*6/UL (ref 4.1–5.1)
SODIUM SERPL-SCNC: 157 MMOL/L (ref 136–145)
WBC NRBC COR # BLD AUTO: 11.7 10*3/UL (ref 4.8–10.8)

## 2017-05-15 PROCEDURE — 5A1D60Z: ICD-10-PCS | Performed by: INTERNAL MEDICINE

## 2017-05-16 VITALS — DIASTOLIC BLOOD PRESSURE: 75 MMHG | SYSTOLIC BLOOD PRESSURE: 133 MMHG

## 2017-05-16 VITALS — SYSTOLIC BLOOD PRESSURE: 124 MMHG | DIASTOLIC BLOOD PRESSURE: 63 MMHG

## 2017-05-16 VITALS — DIASTOLIC BLOOD PRESSURE: 81 MMHG | SYSTOLIC BLOOD PRESSURE: 138 MMHG

## 2017-05-16 VITALS — DIASTOLIC BLOOD PRESSURE: 59 MMHG | SYSTOLIC BLOOD PRESSURE: 126 MMHG

## 2017-05-16 VITALS — DIASTOLIC BLOOD PRESSURE: 49 MMHG

## 2017-05-16 VITALS — DIASTOLIC BLOOD PRESSURE: 67 MMHG

## 2017-05-16 LAB
ALBUMIN SERPL-MCNC: 2.2 GM/DL (ref 3.1–4.5)
ALP SERPL-CCNC: 88 U/L (ref 45–117)
ALT SERPL W P-5'-P-CCNC: 28 U/L (ref 12–78)
AST SERPL-CCNC: 56 IU/L (ref 3–35)
BASOPHILS # BLD AUTO: 0 10*3/UL (ref 0–0.1)
BASOPHILS NFR BLD AUTO: 0.1 % (ref 0–1)
BUN SERPL-MCNC: 40 MG/DL (ref 7–24)
CHLORIDE SERPL-SCNC: 108 MMOL/L (ref 98–107)
CO2 SERPL-SCNC: 18 MMOL/L (ref 21–32)
EOSINOPHIL # BLD AUTO: 0 10*3/UL (ref 0–0.4)
EOSINOPHIL # BLD AUTO: 0.3 % (ref 1–4)
ERYTHROCYTE [DISTWIDTH] IN BLOOD BY AUTOMATED COUNT: 19.8 % (ref 0–14.5)
GLUCOSE SERPL-MCNC: 66 MG/DL (ref 65–99)
HCT VFR BLD AUTO: 22.3 % (ref 37–47)
HGB BLD-MCNC: 7.2 G/DL (ref 12–16)
IG #: 0 10*3/UL (ref 0–0.1)
LYMPHOCYTES # BLD AUTO: 1.2 10*3/UL (ref 1.3–4.4)
LYMPHOCYTES NFR BLD AUTO: 13.9 % (ref 27–41)
MAGNESIUM SERPL-MCNC: 1.4 MG/DL (ref 1.5–2.1)
MCH RBC QN AUTO: 27.4 PG (ref 27–31)
MCHC RBC AUTO-ENTMCNC: 32.3 G/DL (ref 33–37)
MCV RBC AUTO: 84.8 FL (ref 81–99)
MONOCYTES # BLD AUTO: 0.7 10*3/UL (ref 0.1–1)
MONOCYTES NFR BLD MANUAL: 8.3 % (ref 3–9)
NEUT #: 6.9 10*3/UL (ref 2.3–7.9)
NEUT %: 77.2 % (ref 47–73)
NRBC BLD QL AUTO: 0 10*3/UL (ref 0–0)
PHOSPHATE SERPL-MCNC: 4.5 MG/DL (ref 2.5–4.9)
PLATELET # BLD AUTO: 149 10*3/UL (ref 130–400)
PMV BLD AUTO: 12.2 FL (ref 9.6–12.3)
POTASSIUM SERPL-SCNC: 3.6 MMOL/L (ref 3.5–5.1)
PROT SERPL-MCNC: 5.1 GM/DL (ref 6.4–8.2)
RBC # BLD AUTO: 2.63 10*6/UL (ref 4.1–5.1)
SODIUM SERPL-SCNC: 144 MMOL/L (ref 136–145)
WBC NRBC COR # BLD AUTO: 9 10*3/UL (ref 4.8–10.8)

## 2017-05-17 VITALS — DIASTOLIC BLOOD PRESSURE: 87 MMHG

## 2017-05-17 VITALS — DIASTOLIC BLOOD PRESSURE: 80 MMHG | SYSTOLIC BLOOD PRESSURE: 144 MMHG

## 2017-05-17 VITALS — DIASTOLIC BLOOD PRESSURE: 63 MMHG

## 2017-05-17 VITALS — DIASTOLIC BLOOD PRESSURE: 81 MMHG

## 2017-05-17 VITALS — DIASTOLIC BLOOD PRESSURE: 96 MMHG

## 2017-05-17 VITALS — DIASTOLIC BLOOD PRESSURE: 78 MMHG

## 2017-05-17 LAB
ALBUMIN SERPL-MCNC: 2.2 GM/DL (ref 3.1–4.5)
BASOPHILS # BLD AUTO: 0 10*3/UL (ref 0–0.1)
BASOPHILS NFR BLD AUTO: 0.1 % (ref 0–1)
BUN SERPL-MCNC: 50 MG/DL (ref 7–24)
CHLORIDE SERPL-SCNC: 103 MMOL/L (ref 98–107)
CO2 SERPL-SCNC: 20 MMOL/L (ref 21–32)
EOSINOPHIL # BLD AUTO: 0.1 10*3/UL (ref 0–0.4)
EOSINOPHIL # BLD AUTO: 1 % (ref 1–4)
ERYTHROCYTE [DISTWIDTH] IN BLOOD BY AUTOMATED COUNT: 19.7 % (ref 0–14.5)
GLUCOSE SERPL-MCNC: 90 MG/DL (ref 65–99)
HCT VFR BLD AUTO: 21.1 % (ref 37–47)
HGB BLD-MCNC: 6.7 G/DL (ref 12–16)
IG #: 0 10*3/UL (ref 0–0.1)
LYMPHOCYTES # BLD AUTO: 1.4 10*3/UL (ref 1.3–4.4)
LYMPHOCYTES NFR BLD AUTO: 18 % (ref 27–41)
MAGNESIUM SERPL-MCNC: 3 MG/DL (ref 1.5–2.1)
MCH RBC QN AUTO: 27.5 PG (ref 27–31)
MCHC RBC AUTO-ENTMCNC: 31.8 G/DL (ref 33–37)
MCV RBC AUTO: 86.5 FL (ref 81–99)
MONOCYTES # BLD AUTO: 0.7 10*3/UL (ref 0.1–1)
MONOCYTES NFR BLD MANUAL: 9 % (ref 3–9)
NEUT #: 5.7 10*3/UL (ref 2.3–7.9)
NEUT %: 71.8 % (ref 47–73)
NRBC BLD QL AUTO: 0 % (ref 0–0)
PHOSPHATE SERPL-MCNC: 6.2 MG/DL (ref 2.5–4.9)
PLATELET # BLD AUTO: 130 10*3/UL (ref 130–400)
PMV BLD AUTO: 11 FL (ref 9.6–12.3)
POTASSIUM SERPL-SCNC: 4 MMOL/L (ref 3.5–5.1)
RBC # BLD AUTO: 2.44 10*6/UL (ref 4.1–5.1)
SODIUM SERPL-SCNC: 142 MMOL/L (ref 136–145)
WBC NRBC COR # BLD AUTO: 7.9 10*3/UL (ref 4.8–10.8)

## 2017-05-17 PROCEDURE — 30233N1 TRANSFUSION OF NONAUTOLOGOUS RED BLOOD CELLS INTO PERIPHERAL VEIN, PERCUTANEOUS APPROACH: ICD-10-PCS | Performed by: INTERNAL MEDICINE

## 2017-05-18 VITALS — DIASTOLIC BLOOD PRESSURE: 94 MMHG

## 2017-05-18 VITALS — SYSTOLIC BLOOD PRESSURE: 150 MMHG | DIASTOLIC BLOOD PRESSURE: 82 MMHG

## 2017-05-18 VITALS — DIASTOLIC BLOOD PRESSURE: 74 MMHG

## 2017-05-18 VITALS — SYSTOLIC BLOOD PRESSURE: 158 MMHG | DIASTOLIC BLOOD PRESSURE: 92 MMHG

## 2017-05-18 VITALS — SYSTOLIC BLOOD PRESSURE: 120 MMHG | DIASTOLIC BLOOD PRESSURE: 62 MMHG

## 2017-05-18 VITALS — DIASTOLIC BLOOD PRESSURE: 63 MMHG

## 2017-05-18 LAB
ALBUMIN SERPL-MCNC: 2.2 GM/DL (ref 3.1–4.5)
BASOPHILS # BLD AUTO: 0 10*3/UL (ref 0–0.1)
BASOPHILS NFR BLD AUTO: 0.3 % (ref 0–1)
BUN SERPL-MCNC: 20 MG/DL (ref 7–24)
CHLORIDE SERPL-SCNC: 100 MMOL/L (ref 98–107)
CO2 SERPL-SCNC: 22 MMOL/L (ref 21–32)
EOSINOPHIL # BLD AUTO: 0.1 10*3/UL (ref 0–0.4)
EOSINOPHIL # BLD AUTO: 0.5 % (ref 1–4)
ERYTHROCYTE [DISTWIDTH] IN BLOOD BY AUTOMATED COUNT: 17.9 % (ref 0–14.5)
GLUCOSE SERPL-MCNC: 62 MG/DL (ref 65–99)
HCT VFR BLD AUTO: 29 % (ref 37–47)
HGB BLD-MCNC: 9.6 G/DL (ref 12–16)
IG #: 0 10*3/UL (ref 0–0.1)
LYMPHOCYTES # BLD AUTO: 2 10*3/UL (ref 1.3–4.4)
LYMPHOCYTES NFR BLD AUTO: 19.9 % (ref 27–41)
MAGNESIUM SERPL-MCNC: 1.8 MG/DL (ref 1.5–2.1)
MCH RBC QN AUTO: 29.1 PG (ref 27–31)
MCHC RBC AUTO-ENTMCNC: 33.1 G/DL (ref 33–37)
MCV RBC AUTO: 87.9 FL (ref 81–99)
MONOCYTES # BLD AUTO: 0.9 10*3/UL (ref 0.1–1)
MONOCYTES NFR BLD MANUAL: 9.5 % (ref 3–9)
NEUT #: 6.9 10*3/UL (ref 2.3–7.9)
NEUT %: 69.5 % (ref 47–73)
NRBC BLD QL AUTO: 0 10*3/UL (ref 0–0)
PHOSPHATE SERPL-MCNC: 3.5 MG/DL (ref 2.5–4.9)
PLATELET # BLD AUTO: 131 10*3/UL (ref 130–400)
PMV BLD AUTO: 11.2 FL (ref 9.6–12.3)
POTASSIUM SERPL-SCNC: 3.7 MMOL/L (ref 3.5–5.1)
RBC # BLD AUTO: 3.3 10*6/UL (ref 4.1–5.1)
SODIUM SERPL-SCNC: 142 MMOL/L (ref 136–145)
WBC NRBC COR # BLD AUTO: 9.9 10*3/UL (ref 4.8–10.8)

## 2017-05-19 VITALS — DIASTOLIC BLOOD PRESSURE: 64 MMHG | SYSTOLIC BLOOD PRESSURE: 116 MMHG

## 2017-05-19 VITALS — DIASTOLIC BLOOD PRESSURE: 64 MMHG | SYSTOLIC BLOOD PRESSURE: 110 MMHG

## 2017-05-19 VITALS — SYSTOLIC BLOOD PRESSURE: 136 MMHG | DIASTOLIC BLOOD PRESSURE: 84 MMHG

## 2017-05-19 VITALS — DIASTOLIC BLOOD PRESSURE: 76 MMHG

## 2017-05-19 VITALS — DIASTOLIC BLOOD PRESSURE: 82 MMHG

## 2017-05-19 LAB
ALBUMIN SERPL-MCNC: 2.2 GM/DL (ref 3.1–4.5)
BASOPHILS # BLD AUTO: 0 10*3/UL (ref 0–0.1)
BASOPHILS NFR BLD AUTO: 0.5 % (ref 0–1)
BUN SERPL-MCNC: 28 MG/DL (ref 7–24)
CHLORIDE SERPL-SCNC: 102 MMOL/L (ref 98–107)
CO2 SERPL-SCNC: 27 MMOL/L (ref 21–32)
EOSINOPHIL # BLD AUTO: 0.3 10*3/UL (ref 0–0.4)
EOSINOPHIL # BLD AUTO: 3.3 % (ref 1–4)
ERYTHROCYTE [DISTWIDTH] IN BLOOD BY AUTOMATED COUNT: 18.1 % (ref 0–14.5)
GLUCOSE SERPL-MCNC: 94 MG/DL (ref 65–99)
HCT VFR BLD AUTO: 30 % (ref 37–47)
HGB BLD-MCNC: 9.9 G/DL (ref 12–16)
IG #: 0.1 10*3/UL (ref 0–0.1)
LYMPHOCYTES # BLD AUTO: 1.7 10*3/UL (ref 1.3–4.4)
LYMPHOCYTES NFR BLD AUTO: 19.1 % (ref 27–41)
MCH RBC QN AUTO: 29.3 PG (ref 27–31)
MCHC RBC AUTO-ENTMCNC: 33 G/DL (ref 33–37)
MCV RBC AUTO: 88.8 FL (ref 81–99)
MONOCYTES # BLD AUTO: 0.9 10*3/UL (ref 0.1–1)
MONOCYTES NFR BLD MANUAL: 9.8 % (ref 3–9)
NEUT #: 5.9 10*3/UL (ref 2.3–7.9)
NEUT %: 66.4 % (ref 47–73)
NRBC BLD QL AUTO: 0 10*3/UL (ref 0–0)
PHOSPHATE SERPL-MCNC: 2.2 MG/DL (ref 2.5–4.9)
PLATELET # BLD AUTO: 128 10*3/UL (ref 130–400)
PMV BLD AUTO: 11.4 FL (ref 9.6–12.3)
POTASSIUM SERPL-SCNC: 3.2 MMOL/L (ref 3.5–5.1)
RBC # BLD AUTO: 3.38 10*6/UL (ref 4.1–5.1)
SODIUM SERPL-SCNC: 139 MMOL/L (ref 136–145)
WBC NRBC COR # BLD AUTO: 8.8 10*3/UL (ref 4.8–10.8)

## 2017-05-20 VITALS — DIASTOLIC BLOOD PRESSURE: 82 MMHG

## 2017-05-20 VITALS — SYSTOLIC BLOOD PRESSURE: 136 MMHG | DIASTOLIC BLOOD PRESSURE: 73 MMHG

## 2017-05-20 VITALS — SYSTOLIC BLOOD PRESSURE: 132 MMHG | DIASTOLIC BLOOD PRESSURE: 76 MMHG

## 2017-05-20 VITALS — SYSTOLIC BLOOD PRESSURE: 100 MMHG | DIASTOLIC BLOOD PRESSURE: 66 MMHG

## 2017-05-20 LAB
ALBUMIN SERPL-MCNC: 2.1 GM/DL (ref 3.1–4.5)
BASOPHILS # BLD AUTO: 0 10*3/UL (ref 0–0.1)
BASOPHILS NFR BLD AUTO: 0.3 % (ref 0–1)
BUN SERPL-MCNC: 31 MG/DL (ref 7–24)
CHLORIDE SERPL-SCNC: 105 MMOL/L (ref 98–107)
CO2 SERPL-SCNC: 23 MMOL/L (ref 21–32)
EOSINOPHIL # BLD AUTO: 0.6 10*3/UL (ref 0–0.4)
EOSINOPHIL # BLD AUTO: 6.7 % (ref 1–4)
ERYTHROCYTE [DISTWIDTH] IN BLOOD BY AUTOMATED COUNT: 18.1 % (ref 0–14.5)
GLUCOSE SERPL-MCNC: 113 MG/DL (ref 65–99)
HCT VFR BLD AUTO: 31.9 % (ref 37–47)
HGB BLD-MCNC: 10.2 G/DL (ref 12–16)
IG #: 0 10*3/UL (ref 0–0.1)
LYMPHOCYTES # BLD AUTO: 0.9 10*3/UL (ref 1.3–4.4)
LYMPHOCYTES NFR BLD AUTO: 9.6 % (ref 27–41)
MCH RBC QN AUTO: 29.1 PG (ref 27–31)
MCHC RBC AUTO-ENTMCNC: 32 G/DL (ref 33–37)
MCV RBC AUTO: 90.9 FL (ref 81–99)
MONOCYTES # BLD AUTO: 0.9 10*3/UL (ref 0.1–1)
MONOCYTES NFR BLD MANUAL: 9.6 % (ref 3–9)
NEUT #: 7 10*3/UL (ref 2.3–7.9)
NEUT %: 73.4 % (ref 47–73)
NRBC BLD QL AUTO: 0.3 % (ref 0–0)
PHOSPHATE SERPL-MCNC: 1.1 MG/DL (ref 2.5–4.9)
PLATELET # BLD AUTO: 123 10*3/UL (ref 130–400)
PMV BLD AUTO: 11.4 FL (ref 9.6–12.3)
POTASSIUM SERPL-SCNC: 4.2 MMOL/L (ref 3.5–5.1)
RBC # BLD AUTO: 3.51 10*6/UL (ref 4.1–5.1)
SODIUM SERPL-SCNC: 138 MMOL/L (ref 136–145)
WBC NRBC COR # BLD AUTO: 9.5 10*3/UL (ref 4.8–10.8)

## 2017-05-21 VITALS — DIASTOLIC BLOOD PRESSURE: 66 MMHG | SYSTOLIC BLOOD PRESSURE: 107 MMHG

## 2017-05-21 VITALS — DIASTOLIC BLOOD PRESSURE: 61 MMHG

## 2017-05-21 VITALS — SYSTOLIC BLOOD PRESSURE: 120 MMHG | DIASTOLIC BLOOD PRESSURE: 82 MMHG

## 2017-05-21 VITALS — SYSTOLIC BLOOD PRESSURE: 104 MMHG | DIASTOLIC BLOOD PRESSURE: 71 MMHG

## 2017-05-21 VITALS — DIASTOLIC BLOOD PRESSURE: 65 MMHG

## 2017-06-07 ENCOUNTER — HOSPITAL ENCOUNTER (OUTPATIENT)
Dept: HOSPITAL 83 - WOUNDCARE | Age: 47
End: 2017-06-07
Attending: INTERNAL MEDICINE
Payer: MEDICARE

## 2017-06-07 DIAGNOSIS — L89.322: Primary | ICD-10-CM

## 2017-06-07 DIAGNOSIS — L89.153: ICD-10-CM

## 2017-06-07 DIAGNOSIS — N18.9: ICD-10-CM

## 2017-06-07 DIAGNOSIS — L89.899: ICD-10-CM

## 2017-06-07 DIAGNOSIS — Z99.2: ICD-10-CM

## 2017-06-07 DIAGNOSIS — Q05.9: ICD-10-CM

## 2017-06-14 ENCOUNTER — HOSPITAL ENCOUNTER (OUTPATIENT)
Dept: HOSPITAL 83 - WOUNDCARE | Age: 47
End: 2017-06-14
Attending: INTERNAL MEDICINE
Payer: MEDICARE

## 2017-06-14 DIAGNOSIS — N18.9: ICD-10-CM

## 2017-06-14 DIAGNOSIS — L89.322: Primary | ICD-10-CM

## 2017-06-14 DIAGNOSIS — Z99.2: ICD-10-CM

## 2017-06-14 DIAGNOSIS — L89.153: ICD-10-CM

## 2017-06-14 DIAGNOSIS — Q05.9: ICD-10-CM

## 2017-06-20 ENCOUNTER — HOSPITAL ENCOUNTER (EMERGENCY)
Dept: HOSPITAL 83 - ED | Age: 47
Discharge: HOME | End: 2017-06-20
Payer: MEDICARE

## 2017-06-20 VITALS — BODY MASS INDEX: 15.28 KG/M2 | HEIGHT: 55 IN | WEIGHT: 66 LBS

## 2017-06-20 VITALS — DIASTOLIC BLOOD PRESSURE: 99 MMHG | SYSTOLIC BLOOD PRESSURE: 167 MMHG

## 2017-06-20 DIAGNOSIS — N18.6: ICD-10-CM

## 2017-06-20 DIAGNOSIS — Z88.1: ICD-10-CM

## 2017-06-20 DIAGNOSIS — Z99.2: ICD-10-CM

## 2017-06-20 DIAGNOSIS — Z88.5: ICD-10-CM

## 2017-06-20 DIAGNOSIS — K21.9: ICD-10-CM

## 2017-06-20 DIAGNOSIS — Z90.49: ICD-10-CM

## 2017-06-20 DIAGNOSIS — I12.0: ICD-10-CM

## 2017-06-20 DIAGNOSIS — K94.09: Primary | ICD-10-CM

## 2017-06-20 DIAGNOSIS — M19.90: ICD-10-CM

## 2017-06-20 DIAGNOSIS — Z86.73: ICD-10-CM

## 2017-06-20 DIAGNOSIS — Z79.82: ICD-10-CM

## 2017-06-20 DIAGNOSIS — Z79.899: ICD-10-CM

## 2017-06-20 DIAGNOSIS — G89.29: ICD-10-CM

## 2017-06-20 DIAGNOSIS — Z88.8: ICD-10-CM

## 2017-06-20 DIAGNOSIS — Z98.890: ICD-10-CM

## 2017-06-23 ENCOUNTER — HOSPITAL ENCOUNTER (EMERGENCY)
Dept: HOSPITAL 83 - ED | Age: 47
Discharge: HOME | End: 2017-06-23
Payer: MEDICARE

## 2017-06-23 VITALS — HEIGHT: 55 IN | BODY MASS INDEX: 15.28 KG/M2 | WEIGHT: 66 LBS

## 2017-06-23 VITALS — DIASTOLIC BLOOD PRESSURE: 84 MMHG

## 2017-06-23 DIAGNOSIS — I12.0: ICD-10-CM

## 2017-06-23 DIAGNOSIS — R60.0: Primary | ICD-10-CM

## 2017-06-23 DIAGNOSIS — Z88.1: ICD-10-CM

## 2017-06-23 DIAGNOSIS — Z99.2: ICD-10-CM

## 2017-06-23 DIAGNOSIS — Z88.8: ICD-10-CM

## 2017-06-23 DIAGNOSIS — L89.309: ICD-10-CM

## 2017-06-23 DIAGNOSIS — M19.90: ICD-10-CM

## 2017-06-23 DIAGNOSIS — Z88.6: ICD-10-CM

## 2017-06-23 DIAGNOSIS — K21.9: ICD-10-CM

## 2017-06-23 DIAGNOSIS — F17.200: ICD-10-CM

## 2017-06-23 DIAGNOSIS — Z86.718: ICD-10-CM

## 2017-06-23 DIAGNOSIS — N18.6: ICD-10-CM

## 2017-06-23 DIAGNOSIS — Z86.73: ICD-10-CM

## 2017-06-23 DIAGNOSIS — R21: ICD-10-CM

## 2017-06-23 DIAGNOSIS — M54.9: ICD-10-CM

## 2017-06-23 DIAGNOSIS — G89.29: ICD-10-CM

## 2017-06-23 DIAGNOSIS — Z79.899: ICD-10-CM

## 2017-06-27 ENCOUNTER — HOSPITAL ENCOUNTER (OUTPATIENT)
Dept: HOSPITAL 83 - WOUNDCARE | Age: 47
End: 2017-06-27
Attending: INTERNAL MEDICINE
Payer: MEDICARE

## 2017-06-27 DIAGNOSIS — N18.9: ICD-10-CM

## 2017-06-27 DIAGNOSIS — Q05.9: ICD-10-CM

## 2017-06-27 DIAGNOSIS — L89.153: Primary | ICD-10-CM

## 2017-07-06 ENCOUNTER — HOSPITAL ENCOUNTER (OUTPATIENT)
Dept: HOSPITAL 83 - WOUNDCARE | Age: 47
End: 2017-07-06
Attending: INTERNAL MEDICINE
Payer: MEDICARE

## 2017-07-06 DIAGNOSIS — L89.153: Primary | ICD-10-CM

## 2017-07-06 DIAGNOSIS — N18.9: ICD-10-CM

## 2017-07-06 DIAGNOSIS — Q05.9: ICD-10-CM

## 2017-07-06 DIAGNOSIS — Z99.2: ICD-10-CM

## 2017-07-13 ENCOUNTER — HOSPITAL ENCOUNTER (OUTPATIENT)
Dept: HOSPITAL 83 - WOUNDCARE | Age: 47
End: 2017-07-13
Attending: INTERNAL MEDICINE
Payer: MEDICARE

## 2017-07-13 DIAGNOSIS — L89.153: Primary | ICD-10-CM

## 2017-07-13 DIAGNOSIS — Z99.2: ICD-10-CM

## 2017-07-13 DIAGNOSIS — Q05.9: ICD-10-CM

## 2017-08-02 ENCOUNTER — HOSPITAL ENCOUNTER (OUTPATIENT)
Dept: HOSPITAL 83 - WOUNDCARE | Age: 47
End: 2017-08-02
Attending: INTERNAL MEDICINE
Payer: MEDICARE

## 2017-08-02 DIAGNOSIS — Q05.9: ICD-10-CM

## 2017-08-02 DIAGNOSIS — L89.153: Primary | ICD-10-CM

## 2017-08-02 DIAGNOSIS — Z99.2: ICD-10-CM

## 2017-08-02 DIAGNOSIS — N18.9: ICD-10-CM

## 2017-11-03 ENCOUNTER — HOSPITAL ENCOUNTER (OUTPATIENT)
Dept: HOSPITAL 83 - LAB | Age: 47
Discharge: HOME | End: 2017-11-03
Attending: FAMILY MEDICINE
Payer: MEDICARE

## 2017-11-03 DIAGNOSIS — Z79.899: ICD-10-CM

## 2017-11-03 DIAGNOSIS — M79.641: Primary | ICD-10-CM

## 2017-11-03 DIAGNOSIS — E55.9: ICD-10-CM

## 2017-11-03 DIAGNOSIS — M79.642: ICD-10-CM

## 2017-11-03 LAB
ALBUMIN SERPL-MCNC: 2 GM/DL (ref 3.1–4.5)
ALP SERPL-CCNC: 115 U/L (ref 45–117)
ALT SERPL W P-5'-P-CCNC: 30 U/L (ref 12–78)
AST SERPL-CCNC: 31 IU/L (ref 3–35)
BASOPHILS # BLD AUTO: 0 10*3/UL (ref 0–0.1)
BASOPHILS NFR BLD AUTO: 0.5 % (ref 0–1)
BUN SERPL-MCNC: 35 MG/DL (ref 7–24)
CHLORIDE SERPL-SCNC: 101 MMOL/L (ref 98–107)
CHOLEST SERPL-MCNC: 198 MG/DL (ref ?–200)
CREAT SERPL-MCNC: 3.14 MG/DL (ref 0.55–1.02)
EOSINOPHIL # BLD AUTO: 0.2 10*3/UL (ref 0–0.4)
EOSINOPHIL # BLD AUTO: 1.9 % (ref 1–4)
ERYTHROCYTE [DISTWIDTH] IN BLOOD BY AUTOMATED COUNT: 15.9 % (ref 0–14.5)
HCT VFR BLD AUTO: 35.2 % (ref 37–47)
HDLC SERPL-MCNC: 69 MG/DL (ref 40–60)
HGB BLD-MCNC: 11 G/DL (ref 12–16)
LDLC SERPL DIRECT ASSAY-MCNC: 111 MG/DL (ref 9–159)
LYMPHOCYTES # BLD AUTO: 0.5 10*3/UL (ref 1.3–4.4)
LYMPHOCYTES NFR BLD AUTO: 5.5 % (ref 27–41)
MCH RBC QN AUTO: 29.6 PG (ref 27–31)
MCHC RBC AUTO-ENTMCNC: 31.3 G/DL (ref 33–37)
MCV RBC AUTO: 94.6 FL (ref 81–99)
MONOCYTES # BLD AUTO: 0.5 10*3/UL (ref 0.1–1)
MONOCYTES NFR BLD MANUAL: 5.3 % (ref 3–9)
NEUT #: 7.6 10*3/UL (ref 2.3–7.9)
NEUT %: 86.5 % (ref 47–73)
NRBC BLD QL AUTO: 0 % (ref 0–0)
PLATELET # BLD AUTO: 211 10*3/UL (ref 130–400)
PMV BLD AUTO: 9.9 FL (ref 9.6–12.3)
POTASSIUM SERPL-SCNC: 3.9 MMOL/L (ref 3.5–5.1)
PROT SERPL-MCNC: 5.3 GM/DL (ref 6.4–8.2)
RBC # BLD AUTO: 3.72 10*6/UL (ref 4.1–5.1)
SODIUM SERPL-SCNC: 140 MMOL/L (ref 136–145)
TRIGL SERPL-MCNC: 90 MG/DL (ref ?–150)
VLDLC SERPL CALC-MCNC: 18 MG/DL (ref 6–40)
WBC NRBC COR # BLD AUTO: 8.8 10*3/UL (ref 4.8–10.8)

## 2017-12-11 ENCOUNTER — HOSPITAL ENCOUNTER (INPATIENT)
Dept: HOSPITAL 83 - ED | Age: 47
LOS: 2 days | Discharge: HOME | DRG: 871 | End: 2017-12-13
Attending: INTERNAL MEDICINE | Admitting: INTERNAL MEDICINE
Payer: MEDICARE

## 2017-12-11 VITALS — DIASTOLIC BLOOD PRESSURE: 56 MMHG | SYSTOLIC BLOOD PRESSURE: 97 MMHG

## 2017-12-11 VITALS — SYSTOLIC BLOOD PRESSURE: 120 MMHG | DIASTOLIC BLOOD PRESSURE: 74 MMHG

## 2017-12-11 VITALS — SYSTOLIC BLOOD PRESSURE: 119 MMHG | DIASTOLIC BLOOD PRESSURE: 78 MMHG

## 2017-12-11 VITALS — DIASTOLIC BLOOD PRESSURE: 76 MMHG | SYSTOLIC BLOOD PRESSURE: 126 MMHG

## 2017-12-11 VITALS — DIASTOLIC BLOOD PRESSURE: 89 MMHG

## 2017-12-11 VITALS — HEIGHT: 55 IN | WEIGHT: 81.19 LBS | BODY MASS INDEX: 18.79 KG/M2

## 2017-12-11 VITALS — DIASTOLIC BLOOD PRESSURE: 76 MMHG

## 2017-12-11 DIAGNOSIS — G89.29: ICD-10-CM

## 2017-12-11 DIAGNOSIS — F41.8: ICD-10-CM

## 2017-12-11 DIAGNOSIS — Z88.8: ICD-10-CM

## 2017-12-11 DIAGNOSIS — J18.9: ICD-10-CM

## 2017-12-11 DIAGNOSIS — Z88.1: ICD-10-CM

## 2017-12-11 DIAGNOSIS — E87.1: ICD-10-CM

## 2017-12-11 DIAGNOSIS — D68.59: ICD-10-CM

## 2017-12-11 DIAGNOSIS — E43: ICD-10-CM

## 2017-12-11 DIAGNOSIS — Z93.3: ICD-10-CM

## 2017-12-11 DIAGNOSIS — I12.0: ICD-10-CM

## 2017-12-11 DIAGNOSIS — K21.9: ICD-10-CM

## 2017-12-11 DIAGNOSIS — M19.90: ICD-10-CM

## 2017-12-11 DIAGNOSIS — A41.9: Primary | ICD-10-CM

## 2017-12-11 DIAGNOSIS — Z71.6: ICD-10-CM

## 2017-12-11 DIAGNOSIS — Z90.49: ICD-10-CM

## 2017-12-11 DIAGNOSIS — Z88.6: ICD-10-CM

## 2017-12-11 DIAGNOSIS — Z86.73: ICD-10-CM

## 2017-12-11 DIAGNOSIS — G47.00: ICD-10-CM

## 2017-12-11 DIAGNOSIS — Z83.6: ICD-10-CM

## 2017-12-11 DIAGNOSIS — Z93.6: ICD-10-CM

## 2017-12-11 DIAGNOSIS — Z74.01: ICD-10-CM

## 2017-12-11 DIAGNOSIS — Z79.82: ICD-10-CM

## 2017-12-11 DIAGNOSIS — Z79.899: ICD-10-CM

## 2017-12-11 DIAGNOSIS — Z82.49: ICD-10-CM

## 2017-12-11 DIAGNOSIS — Z86.718: ICD-10-CM

## 2017-12-11 DIAGNOSIS — Z80.9: ICD-10-CM

## 2017-12-11 DIAGNOSIS — M54.9: ICD-10-CM

## 2017-12-11 DIAGNOSIS — E83.39: ICD-10-CM

## 2017-12-11 DIAGNOSIS — F17.200: ICD-10-CM

## 2017-12-11 DIAGNOSIS — D64.9: ICD-10-CM

## 2017-12-11 DIAGNOSIS — Z99.2: ICD-10-CM

## 2017-12-11 DIAGNOSIS — N18.6: ICD-10-CM

## 2017-12-11 DIAGNOSIS — Z90.5: ICD-10-CM

## 2017-12-11 LAB
ALBUMIN SERPL-MCNC: 1.9 GM/DL (ref 3.1–4.5)
ALP SERPL-CCNC: 140 U/L (ref 45–117)
ALT SERPL W P-5'-P-CCNC: 28 U/L (ref 12–78)
APPEARANCE UR: (no result)
APTT PPP: 28.5 SECONDS (ref 20.8–31.5)
AST SERPL-CCNC: 26 IU/L (ref 3–35)
BACTERIA #/AREA URNS HPF: (no result) /[HPF]
BASOPHILS # BLD AUTO: 0 10*3/UL (ref 0–0.1)
BASOPHILS NFR BLD AUTO: 0.3 % (ref 0–1)
BILIRUB UR QL STRIP: NEGATIVE
BUN SERPL-MCNC: 58 MG/DL (ref 7–24)
CASTS URNS QL MICRO: (no result)
CHLORIDE SERPL-SCNC: 99 MMOL/L (ref 98–107)
COLOR UR: YELLOW
CREAT SERPL-MCNC: 4.74 MG/DL (ref 0.55–1.02)
CRYSTALS URNS MICRO: (no result)
EOSINOPHIL # BLD AUTO: 0.1 10*3/UL (ref 0–0.4)
EOSINOPHIL # BLD AUTO: 1 % (ref 1–4)
ERYTHROCYTE [DISTWIDTH] IN BLOOD BY AUTOMATED COUNT: 15.4 % (ref 0–14.5)
GLUCOSE UR QL: NEGATIVE
HCT VFR BLD AUTO: 32.4 % (ref 37–47)
HGB BLD-MCNC: 10.7 G/DL (ref 12–16)
HGB UR QL STRIP: (no result)
INR BLD: 1 (ref 2–3.5)
KETONES UR QL STRIP: NEGATIVE
LEUKOCYTE ESTERASE UR QL STRIP: (no result)
LYMPHOCYTES # BLD AUTO: 0.4 10*3/UL (ref 1.3–4.4)
LYMPHOCYTES NFR BLD AUTO: 3.3 % (ref 27–41)
MCH RBC QN AUTO: 29.6 PG (ref 27–31)
MCHC RBC AUTO-ENTMCNC: 33 G/DL (ref 33–37)
MCV RBC AUTO: 89.5 FL (ref 81–99)
MONOCYTES # BLD AUTO: 1.1 10*3/UL (ref 0.1–1)
MONOCYTES NFR BLD MANUAL: 8.6 % (ref 3–9)
NEUT #: 11.3 10*3/UL (ref 2.3–7.9)
NEUT %: 86.4 % (ref 47–73)
NITRITE UR QL STRIP: NEGATIVE
NRBC BLD QL AUTO: 0 10*3/UL (ref 0–0)
PH UR STRIP: >= 9 [PH] (ref 5–9)
PLATELET # BLD AUTO: 178 10*3/UL (ref 130–400)
PMV BLD AUTO: 9.8 FL (ref 9.6–12.3)
POTASSIUM SERPL-SCNC: 3.9 MMOL/L (ref 3.5–5.1)
PROT SERPL-MCNC: 5.3 GM/DL (ref 6.4–8.2)
RBC # BLD AUTO: 3.62 10*6/UL (ref 4.1–5.1)
SODIUM SERPL-SCNC: 136 MMOL/L (ref 136–145)
SP GR UR: 1.01 (ref 1–1.03)
TROPONIN I SERPL-MCNC: < 0.015 NG/ML (ref ?–0.04)
UROBILINOGEN UR STRIP-MCNC: 0.2 E.U./DL (ref 0.2–1)
WBC NRBC COR # BLD AUTO: 13.1 10*3/UL (ref 4.8–10.8)

## 2017-12-11 NOTE — NUR
PT MEDICATED PREVIOUSLY FOR PAIN R/T PNEUMONIA PER PT. STATES BACK AND IN TO
CHEST. MEDICATED PER REQUEST WITH ULTRAM PT STATES MINOR RELIEF. OFFERED
BREATHING TREATMENT AND AFTER TREATMENT PT STATES FEELING A LITTLE BETTER. NO
SIGNS OF DISTRESS. PT CONTINUES TO REST COMFORTABLY

## 2017-12-12 VITALS — SYSTOLIC BLOOD PRESSURE: 132 MMHG | DIASTOLIC BLOOD PRESSURE: 75 MMHG

## 2017-12-12 VITALS — DIASTOLIC BLOOD PRESSURE: 80 MMHG

## 2017-12-12 VITALS — DIASTOLIC BLOOD PRESSURE: 71 MMHG

## 2017-12-12 VITALS — SYSTOLIC BLOOD PRESSURE: 136 MMHG | DIASTOLIC BLOOD PRESSURE: 82 MMHG

## 2017-12-12 LAB
25(OH)D3 SERPL-MCNC: 45.2 NG/ML (ref 30–100)
ALBUMIN SERPL-MCNC: 1.5 GM/DL (ref 3.1–4.5)
ALP SERPL-CCNC: 94 U/L (ref 45–117)
ALT SERPL W P-5'-P-CCNC: 22 U/L (ref 12–78)
AST SERPL-CCNC: 21 IU/L (ref 3–35)
BASOPHILS # BLD AUTO: 0 10*3/UL (ref 0–0.1)
BASOPHILS NFR BLD AUTO: 0.2 % (ref 0–1)
BUN SERPL-MCNC: 64 MG/DL (ref 7–24)
CHLORIDE SERPL-SCNC: 97 MMOL/L (ref 98–107)
CHOLEST SERPL-MCNC: 150 MG/DL (ref ?–200)
CREAT SERPL-MCNC: 4.75 MG/DL (ref 0.55–1.02)
EOSINOPHIL # BLD AUTO: 0.1 10*3/UL (ref 0–0.4)
EOSINOPHIL # BLD AUTO: 0.9 % (ref 1–4)
ERYTHROCYTE [DISTWIDTH] IN BLOOD BY AUTOMATED COUNT: 15.4 % (ref 0–14.5)
HCT VFR BLD AUTO: 27.1 % (ref 37–47)
HDLC SERPL-MCNC: 61 MG/DL (ref 40–60)
HGB BLD-MCNC: 9.1 G/DL (ref 12–16)
LDLC SERPL DIRECT ASSAY-MCNC: 77 MG/DL (ref 9–159)
LYMPHOCYTES # BLD AUTO: 0.4 10*3/UL (ref 1.3–4.4)
LYMPHOCYTES NFR BLD AUTO: 3.5 % (ref 27–41)
MCH RBC QN AUTO: 30 PG (ref 27–31)
MCHC RBC AUTO-ENTMCNC: 33.6 G/DL (ref 33–37)
MCV RBC AUTO: 89.4 FL (ref 81–99)
MONOCYTES # BLD AUTO: 1.2 10*3/UL (ref 0.1–1)
MONOCYTES NFR BLD MANUAL: 11.6 % (ref 3–9)
NEUT #: 8.5 10*3/UL (ref 2.3–7.9)
NEUT %: 83.2 % (ref 47–73)
NRBC BLD QL AUTO: 0 % (ref 0–0)
PHOSPHATE SERPL-MCNC: 6.5 MG/DL (ref 2.5–4.9)
PLATELET # BLD AUTO: 143 10*3/UL (ref 130–400)
PMV BLD AUTO: 10.4 FL (ref 9.6–12.3)
POTASSIUM SERPL-SCNC: 4.1 MMOL/L (ref 3.5–5.1)
PROT SERPL-MCNC: 4.5 GM/DL (ref 6.4–8.2)
RBC # BLD AUTO: 3.03 10*6/UL (ref 4.1–5.1)
SODIUM SERPL-SCNC: 132 MMOL/L (ref 136–145)
T4 FREE SERPL-MCNC: 1.14 NG/DL (ref 0.76–1.46)
TRIGL SERPL-MCNC: 60 MG/DL (ref ?–150)
TSH SERPL DL<=0.005 MIU/L-ACNC: 2.12 UIU/ML (ref 0.36–4.75)
VITAMIN B12: 411 PG/ML (ref 247–911)
VLDLC SERPL CALC-MCNC: 12 MG/DL (ref 6–40)
WBC NRBC COR # BLD AUTO: 10.2 10*3/UL (ref 4.8–10.8)

## 2017-12-12 NOTE — NUR
PATIENT IS AWAKE SITTING UP IN BED WATCHING TV, NO COMPLAINTS AT THIS TIME.
GAVE VERBAL REPORT [facility]

## 2017-12-12 NOTE — NUR
ASSESSMENT COMPLETED AND DOCUMENTED, PT HAS NO COMPLAINTS AT THIS TIME.
ALMAS WILLS [facility]

## 2017-12-12 NOTE — NUR
in to talk to patient.
Patient states lives at home with alone.
There are no steps in the home.
Physician: ranjan kaplan
[facility]: [facility]
Home health services: [facility]
Patient's level of ADLs: BEDFAST
Patient has working utilities: all working
DME: wheelchair
Follow-up physician's appointment after d/c: will be made by hospitalist nurse
director upon discharge
Does patient want to access PORTAL?: no
Discharge plan discussed with patient, home health aid also present, patient
lives at home alone, she has [facility] with aids from [facility] 7 days a
week, she has dialysis monday and friday, but states she doesn't always go,
patient stated she was going back home when able and will have her home health
continue..
 
ANTONI SANABRIA

## 2017-12-12 NOTE — NUR
ASSUMED CARE OF PATIENT. ASSESSMENT COMPLETE. RESTING IN BED. NO VOICED
COMPLAINTS. CALL LIGHT IN REACH. WILL CONTINUE TO MONITOR.

## 2017-12-12 NOTE — NUR
UROSTOMY APPLIANCE CHANGED PER HOME HEALTH NURSE VISITING PER PT REQUEST. PT
TOLERATED WELL. SITE ASYMPTOMATIC. SKIN INTACT SURROUNDING STOMA.  BATHED AND
BED CHANGED WITHOUT COMPLAINT OR DIFFICULTY.

## 2017-12-12 NOTE — NUR
NOTED DIFFICULTY FLUSHING IV. LEAKING AT SITE. DC'D AT THIS TIME. CATH INTACT.
SITE ASYMPTOMATIC. PT TOLERATED WELL.

## 2017-12-12 NOTE — NUR
Patient resting quietly with no c/o discomfort. Respirations easy and regular.
Vital signs stable. No overt distress.
STEFFANIE GOODWIN

## 2017-12-12 NOTE — NUR
IV INSERTED INTO LEFT HAND WITHOUT DIFFICULTY TIMES ONE ATTEMPT. GOOD BLOOD
RETURN AND FLUSHES WITH EASE. PT TOLERATED WELL.

## 2017-12-13 VITALS — DIASTOLIC BLOOD PRESSURE: 71 MMHG

## 2017-12-13 VITALS — DIASTOLIC BLOOD PRESSURE: 78 MMHG

## 2017-12-13 VITALS — DIASTOLIC BLOOD PRESSURE: 84 MMHG

## 2017-12-13 LAB
ALBUMIN SERPL-MCNC: 1.4 GM/DL (ref 3.1–4.5)
BASOPHILS # BLD AUTO: 0 10*3/UL (ref 0–0.1)
BASOPHILS NFR BLD AUTO: 0.4 % (ref 0–1)
BUN SERPL-MCNC: 64 MG/DL (ref 7–24)
CHLORIDE SERPL-SCNC: 96 MMOL/L (ref 98–107)
CREAT SERPL-MCNC: 4.97 MG/DL (ref 0.55–1.02)
EOSINOPHIL # BLD AUTO: 0.3 10*3/UL (ref 0–0.4)
EOSINOPHIL # BLD AUTO: 3.5 % (ref 1–4)
ERYTHROCYTE [DISTWIDTH] IN BLOOD BY AUTOMATED COUNT: 15.3 % (ref 0–14.5)
HCT VFR BLD AUTO: 28.4 % (ref 37–47)
HGB BLD-MCNC: 9.4 G/DL (ref 12–16)
LYMPHOCYTES # BLD AUTO: 0.4 10*3/UL (ref 1.3–4.4)
LYMPHOCYTES NFR BLD AUTO: 5.1 % (ref 27–41)
MCH RBC QN AUTO: 29.7 PG (ref 27–31)
MCHC RBC AUTO-ENTMCNC: 33.1 G/DL (ref 33–37)
MCV RBC AUTO: 89.6 FL (ref 81–99)
MONOCYTES # BLD AUTO: 0.9 10*3/UL (ref 0.1–1)
MONOCYTES NFR BLD MANUAL: 11.7 % (ref 3–9)
NEUT #: 6.1 10*3/UL (ref 2.3–7.9)
NEUT %: 78.8 % (ref 47–73)
NRBC BLD QL AUTO: 0 10*3/UL (ref 0–0)
PHOSPHATE SERPL-MCNC: 6.9 MG/DL (ref 2.5–4.9)
PLATELET # BLD AUTO: 159 10*3/UL (ref 130–400)
PMV BLD AUTO: 10.5 FL (ref 9.6–12.3)
POTASSIUM SERPL-SCNC: 4.3 MMOL/L (ref 3.5–5.1)
RBC # BLD AUTO: 3.17 10*6/UL (ref 4.1–5.1)
SODIUM SERPL-SCNC: 131 MMOL/L (ref 136–145)
WBC NRBC COR # BLD AUTO: 7.8 10*3/UL (ref 4.8–10.8)

## 2017-12-13 NOTE — NUR
SPOKE WITH PT THIS MORNING RE: DIALYSIS TODAY. SHE STILL REFUSES. SHE STATES
SHE WILL WAIT TILL FRIDAY.
DIALYSIS NURSE CALLED UNIT AND SHE WAS NOTIFIED THAT PT IS STILL REFUSING. SHE
STATES SHE WILL NOTIFY DR JASMINE.

## 2017-12-13 NOTE — NUR
DR ODEN NOTIFIED PT REFUSED DIALYSIS BC CXR IS ORDERED FOR AFTER DIALYSIS. HE
STATES HE WILL CANCEL IT.

## 2017-12-13 NOTE — NUR
Discharge instructions reviewed with patient/family. Patient receptive and
verbalizes understanding. Follow-up care arranged. Written instructions given
to patient/family.
GUMARO PETER

## 2017-12-13 NOTE — NUR
Patient resting quietly with no c/o discomfort. Respirations easy and regular.
Vital signs stable. No overt distress.
GUMARO PETER R

## 2017-12-27 ENCOUNTER — HOSPITAL ENCOUNTER (INPATIENT)
Dept: HOSPITAL 83 - ED | Age: 47
LOS: 5 days | Discharge: HOME | DRG: 871 | End: 2018-01-01
Attending: INTERNAL MEDICINE | Admitting: INTERNAL MEDICINE
Payer: MEDICARE

## 2017-12-27 VITALS — HEIGHT: 55 IN | WEIGHT: 78.13 LBS | BODY MASS INDEX: 18.08 KG/M2

## 2017-12-27 VITALS — DIASTOLIC BLOOD PRESSURE: 68 MMHG | SYSTOLIC BLOOD PRESSURE: 118 MMHG

## 2017-12-27 VITALS — DIASTOLIC BLOOD PRESSURE: 100 MMHG

## 2017-12-27 DIAGNOSIS — Z88.1: ICD-10-CM

## 2017-12-27 DIAGNOSIS — D68.59: ICD-10-CM

## 2017-12-27 DIAGNOSIS — J96.00: ICD-10-CM

## 2017-12-27 DIAGNOSIS — D64.9: ICD-10-CM

## 2017-12-27 DIAGNOSIS — Z91.15: ICD-10-CM

## 2017-12-27 DIAGNOSIS — Z82.79: ICD-10-CM

## 2017-12-27 DIAGNOSIS — Z91.14: ICD-10-CM

## 2017-12-27 DIAGNOSIS — Z93.3: ICD-10-CM

## 2017-12-27 DIAGNOSIS — D72.810: ICD-10-CM

## 2017-12-27 DIAGNOSIS — Z88.8: ICD-10-CM

## 2017-12-27 DIAGNOSIS — N31.9: ICD-10-CM

## 2017-12-27 DIAGNOSIS — K21.9: ICD-10-CM

## 2017-12-27 DIAGNOSIS — G47.00: ICD-10-CM

## 2017-12-27 DIAGNOSIS — R31.9: ICD-10-CM

## 2017-12-27 DIAGNOSIS — Z86.718: ICD-10-CM

## 2017-12-27 DIAGNOSIS — Z71.6: ICD-10-CM

## 2017-12-27 DIAGNOSIS — Q05.9: ICD-10-CM

## 2017-12-27 DIAGNOSIS — Z86.73: ICD-10-CM

## 2017-12-27 DIAGNOSIS — K08.409: ICD-10-CM

## 2017-12-27 DIAGNOSIS — Z72.0: ICD-10-CM

## 2017-12-27 DIAGNOSIS — Z82.49: ICD-10-CM

## 2017-12-27 DIAGNOSIS — M19.90: ICD-10-CM

## 2017-12-27 DIAGNOSIS — Z79.899: ICD-10-CM

## 2017-12-27 DIAGNOSIS — Z53.29: ICD-10-CM

## 2017-12-27 DIAGNOSIS — Z98.2: ICD-10-CM

## 2017-12-27 DIAGNOSIS — I77.0: ICD-10-CM

## 2017-12-27 DIAGNOSIS — G89.29: ICD-10-CM

## 2017-12-27 DIAGNOSIS — R00.0: ICD-10-CM

## 2017-12-27 DIAGNOSIS — Z90.49: ICD-10-CM

## 2017-12-27 DIAGNOSIS — F41.8: ICD-10-CM

## 2017-12-27 DIAGNOSIS — E87.5: ICD-10-CM

## 2017-12-27 DIAGNOSIS — Z90.5: ICD-10-CM

## 2017-12-27 DIAGNOSIS — Z82.0: ICD-10-CM

## 2017-12-27 DIAGNOSIS — A41.9: Primary | ICD-10-CM

## 2017-12-27 DIAGNOSIS — E43: ICD-10-CM

## 2017-12-27 DIAGNOSIS — Z83.6: ICD-10-CM

## 2017-12-27 DIAGNOSIS — Z88.6: ICD-10-CM

## 2017-12-27 DIAGNOSIS — M54.9: ICD-10-CM

## 2017-12-27 DIAGNOSIS — J18.9: ICD-10-CM

## 2017-12-27 DIAGNOSIS — N39.0: ICD-10-CM

## 2017-12-27 DIAGNOSIS — Z74.01: ICD-10-CM

## 2017-12-27 DIAGNOSIS — N18.6: ICD-10-CM

## 2017-12-27 LAB
ALBUMIN SERPL-MCNC: 1.8 GM/DL (ref 3.1–4.5)
ALP SERPL-CCNC: 106 U/L (ref 45–117)
ALT SERPL W P-5'-P-CCNC: 30 U/L (ref 12–78)
APPEARANCE UR: (no result)
APTT PPP: 30.3 SECONDS (ref 20.8–31.5)
AST SERPL-CCNC: 39 IU/L (ref 3–35)
BACTERIA #/AREA URNS HPF: (no result) /[HPF]
BASOPHILS # BLD AUTO: 0.1 10*3/UL (ref 0–0.1)
BASOPHILS NFR BLD AUTO: 0.8 % (ref 0–1)
BILIRUB UR QL STRIP: NEGATIVE
BUN SERPL-MCNC: 87 MG/DL (ref 7–24)
CHLORIDE SERPL-SCNC: 109 MMOL/L (ref 98–107)
COLOR UR: YELLOW
CREAT SERPL-MCNC: 6.81 MG/DL (ref 0.55–1.02)
EOSINOPHIL # BLD AUTO: 0 10*3/UL (ref 0–0.4)
EOSINOPHIL # BLD AUTO: 0.4 % (ref 1–4)
ERYTHROCYTE [DISTWIDTH] IN BLOOD BY AUTOMATED COUNT: 16.4 % (ref 0–14.5)
GLUCOSE UR QL: NEGATIVE
HCT VFR BLD AUTO: 34.3 % (ref 37–47)
HGB BLD-MCNC: 10.9 G/DL (ref 12–16)
HGB UR QL STRIP: (no result)
INR BLD: 1.1 (ref 2–3.5)
KETONES UR QL STRIP: NEGATIVE
LEUKOCYTE ESTERASE UR QL STRIP: NEGATIVE
LYMPHOCYTES # BLD AUTO: 0.4 10*3/UL (ref 1.3–4.4)
LYMPHOCYTES NFR BLD AUTO: 5 % (ref 27–41)
MCH RBC QN AUTO: 29.5 PG (ref 27–31)
MCHC RBC AUTO-ENTMCNC: 31.8 G/DL (ref 33–37)
MCV RBC AUTO: 93 FL (ref 81–99)
MONOCYTES # BLD AUTO: 0.6 10*3/UL (ref 0.1–1)
MONOCYTES NFR BLD MANUAL: 6.9 % (ref 3–9)
NEUT #: 6.9 10*3/UL (ref 2.3–7.9)
NEUT %: 86.4 % (ref 47–73)
NITRITE UR QL STRIP: POSITIVE
NRBC BLD QL AUTO: 0 10*3/UL (ref 0–0)
PH UR STRIP: 6.5 [PH] (ref 5–9)
PLATELET # BLD AUTO: 284 10*3/UL (ref 130–400)
PMV BLD AUTO: 9.6 FL (ref 9.6–12.3)
POTASSIUM SERPL-SCNC: 6.1 MMOL/L (ref 3.5–5.1)
PROT SERPL-MCNC: 5.5 GM/DL (ref 6.4–8.2)
RBC # BLD AUTO: 3.69 10*6/UL (ref 4.1–5.1)
RBC #/AREA URNS HPF: (no result) RBC/HPF (ref 0–2)
SODIUM SERPL-SCNC: 139 MMOL/L (ref 136–145)
SP GR UR: 1.01 (ref 1–1.03)
TROPONIN I SERPL-MCNC: < 0.015 NG/ML (ref ?–0.04)
UROBILINOGEN UR STRIP-MCNC: 0.2 E.U./DL (ref 0.2–1)
WBC #/AREA URNS HPF: (no result) WBC/HPF (ref 0–5)
WBC NRBC COR # BLD AUTO: 8 10*3/UL (ref 4.8–10.8)
YEAST #/AREA URNS HPF: (no result) /[HPF]

## 2017-12-28 VITALS — DIASTOLIC BLOOD PRESSURE: 61 MMHG | SYSTOLIC BLOOD PRESSURE: 92 MMHG

## 2017-12-28 VITALS — DIASTOLIC BLOOD PRESSURE: 83 MMHG | SYSTOLIC BLOOD PRESSURE: 132 MMHG

## 2017-12-28 VITALS — DIASTOLIC BLOOD PRESSURE: 68 MMHG | SYSTOLIC BLOOD PRESSURE: 105 MMHG

## 2017-12-28 VITALS — DIASTOLIC BLOOD PRESSURE: 68 MMHG

## 2017-12-28 VITALS — DIASTOLIC BLOOD PRESSURE: 63 MMHG

## 2017-12-28 VITALS — SYSTOLIC BLOOD PRESSURE: 98 MMHG | DIASTOLIC BLOOD PRESSURE: 60 MMHG

## 2017-12-28 LAB
ALBUMIN SERPL-MCNC: 1.5 GM/DL (ref 3.1–4.5)
ALP SERPL-CCNC: 85 U/L (ref 45–117)
ALT SERPL W P-5'-P-CCNC: 28 U/L (ref 12–78)
APTT PPP: 31.5 SECONDS (ref 20.8–31.5)
AST SERPL-CCNC: 36 IU/L (ref 3–35)
BASOPHILS # BLD AUTO: 0 10*3/UL (ref 0–0.1)
BASOPHILS NFR BLD AUTO: 0.3 % (ref 0–1)
BUN SERPL-MCNC: 89 MG/DL (ref 7–24)
CHLORIDE SERPL-SCNC: 108 MMOL/L (ref 98–107)
CREAT SERPL-MCNC: 6.49 MG/DL (ref 0.55–1.02)
EOSINOPHIL # BLD AUTO: 0 10*3/UL (ref 0–0.4)
EOSINOPHIL # BLD AUTO: 0.1 % (ref 1–4)
ERYTHROCYTE [DISTWIDTH] IN BLOOD BY AUTOMATED COUNT: 16.3 % (ref 0–14.5)
HCT VFR BLD AUTO: 28.1 % (ref 37–47)
HGB BLD-MCNC: 9.2 G/DL (ref 12–16)
INR BLD: 1.2 (ref 2–3.5)
LYMPHOCYTES # BLD AUTO: 0.4 10*3/UL (ref 1.3–4.4)
LYMPHOCYTES NFR BLD AUTO: 4.5 % (ref 27–41)
MCH RBC QN AUTO: 30 PG (ref 27–31)
MCHC RBC AUTO-ENTMCNC: 32.7 G/DL (ref 33–37)
MCV RBC AUTO: 91.5 FL (ref 81–99)
MONOCYTES # BLD AUTO: 0.6 10*3/UL (ref 0.1–1)
MONOCYTES NFR BLD MANUAL: 7.1 % (ref 3–9)
NEUT #: 7 10*3/UL (ref 2.3–7.9)
NEUT %: 87.5 % (ref 47–73)
NRBC BLD QL AUTO: 0 10*3/UL (ref 0–0)
PHOSPHATE SERPL-MCNC: 7.1 MG/DL (ref 2.5–4.9)
PLATELET # BLD AUTO: 243 10*3/UL (ref 130–400)
PMV BLD AUTO: 9.3 FL (ref 9.6–12.3)
POTASSIUM SERPL-SCNC: 6.2 MMOL/L (ref 3.5–5.1)
PROT SERPL-MCNC: 4.7 GM/DL (ref 6.4–8.2)
RBC # BLD AUTO: 3.07 10*6/UL (ref 4.1–5.1)
SODIUM SERPL-SCNC: 139 MMOL/L (ref 136–145)
WBC NRBC COR # BLD AUTO: 8 10*3/UL (ref 4.8–10.8)

## 2017-12-28 PROCEDURE — 5A1D70Z PERFORMANCE OF URINARY FILTRATION, INTERMITTENT, LESS THAN 6 HOURS PER DAY: ICD-10-PCS | Performed by: INTERNAL MEDICINE

## 2017-12-29 VITALS — DIASTOLIC BLOOD PRESSURE: 75 MMHG | SYSTOLIC BLOOD PRESSURE: 95 MMHG

## 2017-12-29 VITALS — DIASTOLIC BLOOD PRESSURE: 73 MMHG

## 2017-12-29 VITALS — DIASTOLIC BLOOD PRESSURE: 55 MMHG

## 2017-12-29 VITALS — DIASTOLIC BLOOD PRESSURE: 65 MMHG

## 2017-12-29 VITALS — DIASTOLIC BLOOD PRESSURE: 38 MMHG

## 2017-12-29 VITALS — SYSTOLIC BLOOD PRESSURE: 77 MMHG | DIASTOLIC BLOOD PRESSURE: 42 MMHG

## 2017-12-29 VITALS — DIASTOLIC BLOOD PRESSURE: 50 MMHG

## 2017-12-29 LAB
ALBUMIN SERPL-MCNC: 1.3 GM/DL (ref 3.1–4.5)
BASOPHILS # BLD AUTO: 1 % (ref 0–1)
BUN SERPL-MCNC: 30 MG/DL (ref 7–24)
CHLORIDE SERPL-SCNC: 101 MMOL/L (ref 98–107)
CREAT SERPL-MCNC: 2.94 MG/DL (ref 0.55–1.02)
ERYTHROCYTE [DISTWIDTH] IN BLOOD BY AUTOMATED COUNT: 16.4 % (ref 0–14.5)
HCT VFR BLD AUTO: 27.9 % (ref 37–47)
HGB BLD-MCNC: 9.1 G/DL (ref 12–16)
MCH RBC QN AUTO: 29.7 PG (ref 27–31)
MCHC RBC AUTO-ENTMCNC: 32.6 G/DL (ref 33–37)
MCV RBC AUTO: 91.2 FL (ref 81–99)
MICROCYTES BLD QL SMEAR: SLIGHT
NRBC BLD QL AUTO: 0 10*3/UL (ref 0–0)
OVALOCYTES BLD QL SMEAR: (no result)
PHOSPHATE SERPL-MCNC: 5.9 MG/DL (ref 2.5–4.9)
PLATELET # BLD AUTO: 195 10*3/UL (ref 130–400)
PLATELET SUFFICIENCY: NORMAL
PMV BLD AUTO: 9.4 FL (ref 9.6–12.3)
POTASSIUM SERPL-SCNC: 4.1 MMOL/L (ref 3.5–5.1)
RBC # BLD AUTO: 3.06 10*6/UL (ref 4.1–5.1)
SODIUM SERPL-SCNC: 140 MMOL/L (ref 136–145)
TOTAL CELLS COUNTED: 100 #CELLS
VARIANT LYMPHS NFR BLD MANUAL: 1 % (ref 0–0)
WBC NRBC COR # BLD AUTO: 9.7 10*3/UL (ref 4.8–10.8)

## 2017-12-30 VITALS — DIASTOLIC BLOOD PRESSURE: 47 MMHG | SYSTOLIC BLOOD PRESSURE: 79 MMHG

## 2017-12-30 VITALS — SYSTOLIC BLOOD PRESSURE: 104 MMHG | DIASTOLIC BLOOD PRESSURE: 68 MMHG

## 2017-12-30 VITALS — DIASTOLIC BLOOD PRESSURE: 62 MMHG

## 2017-12-30 VITALS — DIASTOLIC BLOOD PRESSURE: 72 MMHG | SYSTOLIC BLOOD PRESSURE: 112 MMHG

## 2017-12-30 VITALS — DIASTOLIC BLOOD PRESSURE: 66 MMHG

## 2017-12-30 VITALS — DIASTOLIC BLOOD PRESSURE: 64 MMHG

## 2017-12-30 LAB
ALBUMIN SERPL-MCNC: 1.4 GM/DL (ref 3.1–4.5)
ALP SERPL-CCNC: 77 U/L (ref 45–117)
ALT SERPL W P-5'-P-CCNC: 31 U/L (ref 12–78)
AST SERPL-CCNC: 81 IU/L (ref 3–35)
BUN SERPL-MCNC: 36 MG/DL (ref 7–24)
BURR CELLS BLD QL SMEAR: (no result)
CHLORIDE SERPL-SCNC: 100 MMOL/L (ref 98–107)
CREAT SERPL-MCNC: 3.6 MG/DL (ref 0.55–1.02)
ERYTHROCYTE [DISTWIDTH] IN BLOOD BY AUTOMATED COUNT: 16.6 % (ref 0–14.5)
HCT VFR BLD AUTO: 30.1 % (ref 37–47)
HGB BLD-MCNC: 9.6 G/DL (ref 12–16)
MCH RBC QN AUTO: 29.5 PG (ref 27–31)
MCHC RBC AUTO-ENTMCNC: 31.9 G/DL (ref 33–37)
MCV RBC AUTO: 92.6 FL (ref 81–99)
NRBC BLD QL AUTO: 0 10*3/UL (ref 0–0)
PHOSPHATE SERPL-MCNC: 7.4 MG/DL (ref 2.5–4.9)
PLATELET # BLD AUTO: 198 10*3/UL (ref 130–400)
PLATELET SUFFICIENCY: NORMAL
PMV BLD AUTO: 10.5 FL (ref 9.6–12.3)
POLYCHROMASIA BLD QL SMEAR: SLIGHT
POTASSIUM SERPL-SCNC: 4.6 MMOL/L (ref 3.5–5.1)
PROT SERPL-MCNC: 4.7 GM/DL (ref 6.4–8.2)
RBC # BLD AUTO: 3.25 10*6/UL (ref 4.1–5.1)
SODIUM SERPL-SCNC: 138 MMOL/L (ref 136–145)
TOTAL CELLS COUNTED: 100 #CELLS
WBC NRBC COR # BLD AUTO: 7.8 10*3/UL (ref 4.8–10.8)

## 2017-12-31 VITALS — DIASTOLIC BLOOD PRESSURE: 53 MMHG | SYSTOLIC BLOOD PRESSURE: 92 MMHG

## 2017-12-31 VITALS — SYSTOLIC BLOOD PRESSURE: 96 MMHG | DIASTOLIC BLOOD PRESSURE: 48 MMHG

## 2017-12-31 VITALS — DIASTOLIC BLOOD PRESSURE: 55 MMHG | SYSTOLIC BLOOD PRESSURE: 90 MMHG

## 2017-12-31 VITALS — DIASTOLIC BLOOD PRESSURE: 50 MMHG

## 2017-12-31 VITALS — DIASTOLIC BLOOD PRESSURE: 52 MMHG | SYSTOLIC BLOOD PRESSURE: 90 MMHG

## 2017-12-31 LAB
ALBUMIN SERPL-MCNC: 1.5 GM/DL (ref 3.1–4.5)
BUN SERPL-MCNC: 50 MG/DL (ref 7–24)
BURR CELLS BLD QL SMEAR: (no result)
CHLORIDE SERPL-SCNC: 100 MMOL/L (ref 98–107)
CREAT SERPL-MCNC: 4.21 MG/DL (ref 0.55–1.02)
ERYTHROCYTE [DISTWIDTH] IN BLOOD BY AUTOMATED COUNT: 16.7 % (ref 0–14.5)
HCT VFR BLD AUTO: 32.9 % (ref 37–47)
HGB BLD-MCNC: 10.4 G/DL (ref 12–16)
MCH RBC QN AUTO: 29.8 PG (ref 27–31)
MCHC RBC AUTO-ENTMCNC: 31.6 G/DL (ref 33–37)
MCV RBC AUTO: 94.3 FL (ref 81–99)
NRBC BLD QL AUTO: 0 % (ref 0–0)
PHOSPHATE SERPL-MCNC: 8 MG/DL (ref 2.5–4.9)
PLATELET # BLD AUTO: 181 10*3/UL (ref 130–400)
PLATELET SUFFICIENCY: NORMAL
PMV BLD AUTO: 9.8 FL (ref 9.6–12.3)
POTASSIUM SERPL-SCNC: 4.6 MMOL/L (ref 3.5–5.1)
RBC # BLD AUTO: 3.49 10*6/UL (ref 4.1–5.1)
SCHISTOCYTES BLD QL SMEAR: (no result)
SODIUM SERPL-SCNC: 139 MMOL/L (ref 136–145)
TOTAL CELLS COUNTED: 100 #CELLS
WBC NRBC COR # BLD AUTO: 7.3 10*3/UL (ref 4.8–10.8)

## 2018-01-01 VITALS — DIASTOLIC BLOOD PRESSURE: 56 MMHG

## 2018-01-01 VITALS — DIASTOLIC BLOOD PRESSURE: 63 MMHG

## 2018-01-01 LAB
ALBUMIN SERPL-MCNC: 1.4 GM/DL (ref 3.1–4.5)
BUN SERPL-MCNC: 54 MG/DL (ref 7–24)
BURR CELLS BLD QL SMEAR: (no result)
CHLORIDE SERPL-SCNC: 100 MMOL/L (ref 98–107)
CREAT SERPL-MCNC: 4.81 MG/DL (ref 0.55–1.02)
ERYTHROCYTE [DISTWIDTH] IN BLOOD BY AUTOMATED COUNT: 16.7 % (ref 0–14.5)
HCT VFR BLD AUTO: 30.4 % (ref 37–47)
HGB BLD-MCNC: 9.8 G/DL (ref 12–16)
MCH RBC QN AUTO: 30.3 PG (ref 27–31)
MCHC RBC AUTO-ENTMCNC: 32.2 G/DL (ref 33–37)
MCV RBC AUTO: 94.1 FL (ref 81–99)
NRBC BLD QL AUTO: 0 10*3/UL (ref 0–0)
PHOSPHATE SERPL-MCNC: 7.7 MG/DL (ref 2.5–4.9)
PLATELET # BLD AUTO: 174 10*3/UL (ref 130–400)
PLATELET SUFFICIENCY: NORMAL
PMV BLD AUTO: 10 FL (ref 9.6–12.3)
POLYCHROMASIA BLD QL SMEAR: SLIGHT
POTASSIUM SERPL-SCNC: 4.7 MMOL/L (ref 3.5–5.1)
RBC # BLD AUTO: 3.23 10*6/UL (ref 4.1–5.1)
SODIUM SERPL-SCNC: 141 MMOL/L (ref 136–145)
TOTAL CELLS COUNTED: 100 #CELLS
WBC NRBC COR # BLD AUTO: 9.1 10*3/UL (ref 4.8–10.8)

## 2018-01-03 ENCOUNTER — HOSPITAL ENCOUNTER (EMERGENCY)
Dept: HOSPITAL 83 - ED | Age: 48
Discharge: ANOTHER HOSPITAL | End: 2018-01-03
Payer: MEDICARE

## 2018-01-03 VITALS — BODY MASS INDEX: 17.82 KG/M2 | WEIGHT: 77 LBS | HEIGHT: 55 IN

## 2018-01-03 VITALS — DIASTOLIC BLOOD PRESSURE: 81 MMHG

## 2018-01-03 DIAGNOSIS — Z86.718: ICD-10-CM

## 2018-01-03 DIAGNOSIS — Z90.89: ICD-10-CM

## 2018-01-03 DIAGNOSIS — R51: Primary | ICD-10-CM

## 2018-01-03 DIAGNOSIS — Z88.8: ICD-10-CM

## 2018-01-03 DIAGNOSIS — Z86.73: ICD-10-CM

## 2018-01-03 DIAGNOSIS — Z88.6: ICD-10-CM

## 2018-01-03 DIAGNOSIS — F17.210: ICD-10-CM

## 2018-01-03 DIAGNOSIS — Z93.3: ICD-10-CM

## 2018-01-03 DIAGNOSIS — G47.00: ICD-10-CM

## 2018-01-03 DIAGNOSIS — N18.6: ICD-10-CM

## 2018-01-03 DIAGNOSIS — R09.02: ICD-10-CM

## 2018-01-03 DIAGNOSIS — K21.9: ICD-10-CM

## 2018-01-03 DIAGNOSIS — Z99.2: ICD-10-CM

## 2018-01-03 DIAGNOSIS — Z98.2: ICD-10-CM

## 2018-01-03 DIAGNOSIS — F32.9: ICD-10-CM

## 2018-01-03 DIAGNOSIS — Z90.49: ICD-10-CM

## 2018-01-03 DIAGNOSIS — J81.1: ICD-10-CM

## 2018-01-03 DIAGNOSIS — Z79.899: ICD-10-CM

## 2018-01-03 DIAGNOSIS — F41.9: ICD-10-CM

## 2018-01-03 DIAGNOSIS — G89.29: ICD-10-CM

## 2018-01-03 LAB
ALBUMIN SERPL-MCNC: 1.3 GM/DL (ref 3.1–4.5)
ALP SERPL-CCNC: 85 U/L (ref 45–117)
ALT SERPL W P-5'-P-CCNC: 36 U/L (ref 12–78)
APTT PPP: 47.8 SECONDS (ref 19.5–32.1)
AST SERPL-CCNC: 116 IU/L (ref 3–35)
BASE EXCESS BLDA CALC-SCNC: -3.9 MMOL/L (ref -2–2)
BUN SERPL-MCNC: 65 MG/DL (ref 7–24)
CHLORIDE SERPL-SCNC: 102 MMOL/L (ref 98–107)
CREAT SERPL-MCNC: 5.89 MG/DL (ref 0.55–1.02)
ERYTHROCYTE [DISTWIDTH] IN BLOOD BY AUTOMATED COUNT: 16.8 % (ref 0–14.5)
HCO3 BLDA-SCNC: 21.3 MMOL/L (ref 22–26)
HCT VFR BLD AUTO: 32 % (ref 37–47)
HGB BLD-MCNC: 10.7 G/DL (ref 12–16)
INR BLD: 1.2 (ref 2–3.5)
MCH RBC QN AUTO: 29.8 PG (ref 27–31)
MCHC RBC AUTO-ENTMCNC: 33.4 G/DL (ref 33–37)
MCV RBC AUTO: 89.1 FL (ref 81–99)
NRBC BLD QL AUTO: 0 10*3/UL (ref 0–0)
PCO2 BLDA: 42.1 MMHG (ref 35–45)
PH BLDA: 7.33 [PH] (ref 7.35–7.45)
PLATELET # BLD AUTO: 136 10*3/UL (ref 130–400)
PLATELET SUFFICIENCY: NORMAL
PMV BLD AUTO: 9.3 FL (ref 9.6–12.3)
PO2 BLDA: 41 MMHG (ref 80–90)
POTASSIUM SERPL-SCNC: 5 MMOL/L (ref 3.5–5.1)
PROT SERPL-MCNC: 4.5 GM/DL (ref 6.4–8.2)
RBC # BLD AUTO: 3.59 10*6/UL (ref 4.1–5.1)
RBC MORPH BLD: NORMAL
SAO2 % BLDA: 65.8 % (ref 95–97)
SODIUM SERPL-SCNC: 140 MMOL/L (ref 136–145)
TOTAL CELLS COUNTED: 100 #CELLS
TROPONIN I SERPL-MCNC: 0.27 NG/ML (ref ?–0.04)
VARIANT LYMPHS NFR BLD MANUAL: 1 % (ref 0–0)
WBC NRBC COR # BLD AUTO: 11.7 10*3/UL (ref 4.8–10.8)

## 2018-02-20 ENCOUNTER — HOSPITAL ENCOUNTER (OUTPATIENT)
Dept: HOSPITAL 83 - WOUNDCARE | Age: 48
Discharge: HOME | End: 2018-02-20
Payer: MEDICARE

## 2018-02-20 DIAGNOSIS — L97.411: ICD-10-CM

## 2018-02-20 DIAGNOSIS — L89.890: ICD-10-CM

## 2018-02-20 DIAGNOSIS — L98.411: ICD-10-CM

## 2018-02-20 DIAGNOSIS — N18.6: ICD-10-CM

## 2018-02-20 DIAGNOSIS — L89.324: ICD-10-CM

## 2018-02-20 DIAGNOSIS — L89.612: ICD-10-CM

## 2018-02-20 DIAGNOSIS — L98.491: ICD-10-CM

## 2018-02-20 DIAGNOSIS — L97.821: ICD-10-CM

## 2018-02-20 DIAGNOSIS — F17.200: ICD-10-CM

## 2018-02-20 DIAGNOSIS — L89.153: ICD-10-CM

## 2018-02-20 DIAGNOSIS — E11.621: ICD-10-CM

## 2018-02-20 DIAGNOSIS — L97.521: ICD-10-CM

## 2018-02-20 DIAGNOSIS — E11.622: Primary | ICD-10-CM

## 2018-02-27 ENCOUNTER — HOSPITAL ENCOUNTER (OUTPATIENT)
Dept: HOSPITAL 83 - WOUNDCARE | Age: 48
Discharge: HOME | End: 2018-02-27
Payer: MEDICARE

## 2018-02-27 DIAGNOSIS — L89.153: ICD-10-CM

## 2018-02-27 DIAGNOSIS — L97.411: ICD-10-CM

## 2018-02-27 DIAGNOSIS — L89.612: ICD-10-CM

## 2018-02-27 DIAGNOSIS — E11.622: Primary | ICD-10-CM

## 2018-02-27 DIAGNOSIS — E11.22: ICD-10-CM

## 2018-02-27 DIAGNOSIS — L98.411: ICD-10-CM

## 2018-02-27 DIAGNOSIS — L89.324: ICD-10-CM

## 2018-02-27 DIAGNOSIS — Q05.9: ICD-10-CM

## 2018-02-27 DIAGNOSIS — L89.899: ICD-10-CM

## 2018-02-27 DIAGNOSIS — L97.821: ICD-10-CM

## 2018-02-27 DIAGNOSIS — E11.621: ICD-10-CM

## 2018-02-27 DIAGNOSIS — L97.521: ICD-10-CM

## 2018-02-27 DIAGNOSIS — N18.6: ICD-10-CM

## 2018-02-27 DIAGNOSIS — F17.200: ICD-10-CM

## 2018-03-06 ENCOUNTER — HOSPITAL ENCOUNTER (OUTPATIENT)
Dept: HOSPITAL 83 - US | Age: 48
Discharge: HOME | End: 2018-03-06
Payer: MEDICARE

## 2018-03-06 DIAGNOSIS — E10.621: ICD-10-CM

## 2018-03-06 DIAGNOSIS — E08.22: ICD-10-CM

## 2018-03-06 DIAGNOSIS — Z86.31: Primary | ICD-10-CM

## 2018-03-18 ENCOUNTER — HOSPITAL ENCOUNTER (EMERGENCY)
Dept: HOSPITAL 83 - ED | Age: 48
LOS: 1 days | Discharge: HOME | End: 2018-03-19
Payer: MEDICARE

## 2018-03-18 VITALS — HEIGHT: 55 IN | WEIGHT: 86 LBS

## 2018-03-18 DIAGNOSIS — Z88.1: ICD-10-CM

## 2018-03-18 DIAGNOSIS — Z79.899: ICD-10-CM

## 2018-03-18 DIAGNOSIS — Z00.8: Primary | ICD-10-CM

## 2018-03-18 DIAGNOSIS — F17.200: ICD-10-CM

## 2018-03-18 DIAGNOSIS — Z88.8: ICD-10-CM

## 2018-03-18 DIAGNOSIS — Z79.82: ICD-10-CM

## 2018-03-18 LAB
ERYTHROCYTE [DISTWIDTH] IN BLOOD BY AUTOMATED COUNT: 16.6 % (ref 0–14.5)
HCT VFR BLD AUTO: 33.7 % (ref 37–47)
HGB BLD-MCNC: 10.2 G/DL (ref 12–16)
MCH RBC QN AUTO: 29.1 PG (ref 27–31)
MCHC RBC AUTO-ENTMCNC: 30.3 G/DL (ref 33–37)
MCV RBC AUTO: 96 FL (ref 81–99)
NRBC BLD QL AUTO: 0 % (ref 0–0)
PLATELET # BLD AUTO: 258 10*3/UL (ref 130–400)
PMV BLD AUTO: 9.6 FL (ref 9.6–12.3)
RBC # BLD AUTO: 3.51 10*6/UL (ref 4.1–5.1)
WBC NRBC COR # BLD AUTO: 14.1 10*3/UL (ref 4.8–10.8)

## 2018-03-19 VITALS — SYSTOLIC BLOOD PRESSURE: 177 MMHG | DIASTOLIC BLOOD PRESSURE: 99 MMHG

## 2018-03-19 LAB
ALBUMIN SERPL-MCNC: 2.3 GM/DL (ref 3.1–4.5)
ALP SERPL-CCNC: 138 U/L (ref 45–117)
ALT SERPL W P-5'-P-CCNC: 24 U/L (ref 12–78)
AST SERPL-CCNC: 36 IU/L (ref 3–35)
BUN SERPL-MCNC: 72 MG/DL (ref 7–24)
CHLORIDE SERPL-SCNC: 108 MMOL/L (ref 98–107)
CREAT SERPL-MCNC: 5.8 MG/DL (ref 0.55–1.02)
PLATELET SUFFICIENCY: NORMAL
POTASSIUM SERPL-SCNC: 5.5 MMOL/L (ref 3.5–5.1)
PROT SERPL-MCNC: 7 GM/DL (ref 6.4–8.2)
RBC MORPH BLD: NORMAL
SODIUM SERPL-SCNC: 142 MMOL/L (ref 136–145)
TOTAL CELLS COUNTED: 100 #CELLS

## 2018-04-26 ENCOUNTER — HOSPITAL ENCOUNTER (OUTPATIENT)
Dept: HOSPITAL 83 - WOUNDCARE | Age: 48
Discharge: HOME | End: 2018-04-26
Payer: MEDICARE

## 2018-04-26 DIAGNOSIS — Z93.3: ICD-10-CM

## 2018-04-26 DIAGNOSIS — N18.6: ICD-10-CM

## 2018-04-26 DIAGNOSIS — Z90.49: ICD-10-CM

## 2018-04-26 DIAGNOSIS — Q05.9: ICD-10-CM

## 2018-04-26 DIAGNOSIS — L89.44: ICD-10-CM

## 2018-04-26 DIAGNOSIS — L89.311: ICD-10-CM

## 2018-04-26 DIAGNOSIS — F17.200: ICD-10-CM

## 2018-04-26 DIAGNOSIS — L89.153: ICD-10-CM

## 2018-04-26 DIAGNOSIS — L89.321: Primary | ICD-10-CM

## 2018-05-01 LAB
D-DIMER QUANTITATIVE: 2.18 MG/L FEU (ref 0–0.49)
LV EF: 70 %
LVEF MODALITY: NORMAL

## 2018-06-13 ENCOUNTER — HOSPITAL ENCOUNTER (EMERGENCY)
Dept: HOSPITAL 83 - ED | Age: 48
LOS: 1 days | Discharge: ANOTHER HOSPITAL | End: 2018-06-14
Payer: MEDICARE

## 2018-06-13 VITALS — WEIGHT: 50 LBS | HEIGHT: 55 IN | BODY MASS INDEX: 11.57 KG/M2

## 2018-06-13 DIAGNOSIS — R56.9: Primary | ICD-10-CM

## 2018-06-13 DIAGNOSIS — Z79.899: ICD-10-CM

## 2018-06-13 DIAGNOSIS — F17.200: ICD-10-CM

## 2018-06-13 DIAGNOSIS — Z88.8: ICD-10-CM

## 2018-06-13 DIAGNOSIS — Z88.6: ICD-10-CM

## 2018-06-13 DIAGNOSIS — Z86.718: ICD-10-CM

## 2018-06-13 DIAGNOSIS — N18.6: ICD-10-CM

## 2018-06-13 DIAGNOSIS — I12.0: ICD-10-CM

## 2018-06-13 DIAGNOSIS — Z86.73: ICD-10-CM

## 2018-06-13 DIAGNOSIS — Z88.1: ICD-10-CM

## 2018-06-13 DIAGNOSIS — K21.9: ICD-10-CM

## 2018-06-13 LAB
ALBUMIN SERPL-MCNC: 2.3 GM/DL (ref 3.1–4.5)
ALP SERPL-CCNC: 116 U/L (ref 45–117)
ALT SERPL W P-5'-P-CCNC: 23 U/L (ref 12–78)
APPEARANCE UR: (no result)
APTT PPP: 27.1 SECONDS (ref 20.8–31.5)
AST SERPL-CCNC: 22 IU/L (ref 3–35)
BACTERIA #/AREA URNS HPF: (no result) /[HPF]
BASE EXCESS BLDA CALC-SCNC: -11.2 MMOL/L (ref -2–2)
BASOPHILS # BLD AUTO: 0.1 10*3/UL (ref 0–0.1)
BASOPHILS NFR BLD AUTO: 0.6 % (ref 0–1)
BILIRUB UR QL STRIP: NEGATIVE
BUN SERPL-MCNC: 87 MG/DL (ref 7–24)
CHLORIDE SERPL-SCNC: 104 MMOL/L (ref 98–107)
COLOR UR: YELLOW
CREAT SERPL-MCNC: 6.36 MG/DL (ref 0.55–1.02)
EOSINOPHIL # BLD AUTO: 0.6 10*3/UL (ref 0–0.4)
EOSINOPHIL # BLD AUTO: 4.5 % (ref 1–4)
EPI CELLS #/AREA URNS HPF: (no result) /[HPF]
ERYTHROCYTE [DISTWIDTH] IN BLOOD BY AUTOMATED COUNT: 16.3 % (ref 0–14.5)
GLUCOSE UR QL: NEGATIVE
HCO3 BLDA-SCNC: 15.1 MMOL/L (ref 22–26)
HCT VFR BLD AUTO: 42.9 % (ref 37–47)
HGB BLD-MCNC: 12.9 G/DL (ref 12–16)
HGB UR QL STRIP: (no result)
INR BLD: 1.1 (ref 2–3.5)
KETONES UR QL STRIP: NEGATIVE
LEUKOCYTE ESTERASE UR QL STRIP: (no result)
LIPASE SERPL-CCNC: 228 U/L (ref 73–393)
LYMPHOCYTES # BLD AUTO: 0.5 10*3/UL (ref 1.3–4.4)
LYMPHOCYTES NFR BLD AUTO: 3.5 % (ref 27–41)
MCH RBC QN AUTO: 29 PG (ref 27–31)
MCHC RBC AUTO-ENTMCNC: 30.1 G/DL (ref 33–37)
MCV RBC AUTO: 96.4 FL (ref 81–99)
MONOCYTES # BLD AUTO: 0.6 10*3/UL (ref 0.1–1)
MONOCYTES NFR BLD MANUAL: 4.9 % (ref 3–9)
NEUT #: 11 10*3/UL (ref 2.3–7.9)
NEUT %: 86 % (ref 47–73)
NITRITE UR QL STRIP: NEGATIVE
NRBC BLD QL AUTO: 0 10*3/UL (ref 0–0)
PCO2 BLDA: 36.2 MMHG (ref 35–45)
PH BLDA: 7.24 [PH] (ref 7.35–7.45)
PH UR STRIP: 7 [PH] (ref 5–9)
PLATELET # BLD AUTO: 291 10*3/UL (ref 130–400)
PMV BLD AUTO: 9.4 FL (ref 9.6–12.3)
PO2 BLDA: 300 MMHG (ref 80–90)
POTASSIUM SERPL-SCNC: 5.6 MMOL/L (ref 3.5–5.1)
PROT SERPL-MCNC: 6.3 GM/DL (ref 6.4–8.2)
RBC # BLD AUTO: 4.45 10*6/UL (ref 4.1–5.1)
RBC #/AREA URNS HPF: (no result) RBC/HPF (ref 0–2)
SAO2 % BLDA: 99.4 % (ref 95–97)
SODIUM SERPL-SCNC: 136 MMOL/L (ref 136–145)
SP GR UR: 1.02 (ref 1–1.03)
TROPONIN I SERPL-MCNC: < 0.015 NG/ML (ref ?–0.04)
UROBILINOGEN UR STRIP-MCNC: 0.2 E.U./DL (ref 0.2–1)
WBC #/AREA URNS HPF: (no result) WBC/HPF (ref 0–5)
WBC NRBC COR # BLD AUTO: 12.8 10*3/UL (ref 4.8–10.8)

## 2018-06-14 VITALS — SYSTOLIC BLOOD PRESSURE: 147 MMHG | DIASTOLIC BLOOD PRESSURE: 62 MMHG

## 2018-06-22 ENCOUNTER — HOSPITAL ENCOUNTER (OUTPATIENT)
Dept: HOSPITAL 83 - WOUNDCARE | Age: 48
Discharge: HOME | End: 2018-06-22
Payer: MEDICARE

## 2018-06-22 DIAGNOSIS — Q05.9: ICD-10-CM

## 2018-06-22 DIAGNOSIS — Z71.6: ICD-10-CM

## 2018-06-22 DIAGNOSIS — L89.322: Primary | ICD-10-CM

## 2018-06-22 DIAGNOSIS — Z90.5: ICD-10-CM

## 2018-06-22 DIAGNOSIS — Z93.3: ICD-10-CM

## 2018-06-22 DIAGNOSIS — F17.200: ICD-10-CM

## 2018-06-22 DIAGNOSIS — N18.6: ICD-10-CM

## 2018-06-22 DIAGNOSIS — L89.892: ICD-10-CM

## 2018-06-22 DIAGNOSIS — N76.6: ICD-10-CM

## 2018-06-29 ENCOUNTER — HOSPITAL ENCOUNTER (OUTPATIENT)
Dept: HOSPITAL 83 - WOUNDCARE | Age: 48
Discharge: HOME | End: 2018-06-29
Attending: NURSE PRACTITIONER
Payer: MEDICARE

## 2018-06-29 DIAGNOSIS — Z71.6: ICD-10-CM

## 2018-06-29 DIAGNOSIS — Q05.9: ICD-10-CM

## 2018-06-29 DIAGNOSIS — F17.200: ICD-10-CM

## 2018-06-29 DIAGNOSIS — Z90.49: ICD-10-CM

## 2018-06-29 DIAGNOSIS — L89.892: ICD-10-CM

## 2018-06-29 DIAGNOSIS — N76.6: ICD-10-CM

## 2018-06-29 DIAGNOSIS — L89.322: Primary | ICD-10-CM

## 2018-06-29 DIAGNOSIS — N18.6: ICD-10-CM

## 2018-07-06 ENCOUNTER — HOSPITAL ENCOUNTER (OUTPATIENT)
Dept: HOSPITAL 83 - WOUNDCARE | Age: 48
End: 2018-07-06
Attending: NURSE PRACTITIONER
Payer: MEDICARE

## 2018-07-06 DIAGNOSIS — F17.210: ICD-10-CM

## 2018-07-06 DIAGNOSIS — L89.892: ICD-10-CM

## 2018-07-06 DIAGNOSIS — Q05.9: ICD-10-CM

## 2018-07-06 DIAGNOSIS — L89.322: Primary | ICD-10-CM

## 2018-07-06 DIAGNOSIS — N18.6: ICD-10-CM

## 2018-07-20 ENCOUNTER — HOSPITAL ENCOUNTER (OUTPATIENT)
Dept: HOSPITAL 83 - WOUNDCARE | Age: 48
Discharge: HOME | End: 2018-07-20
Attending: NURSE PRACTITIONER
Payer: MEDICARE

## 2018-07-20 DIAGNOSIS — F17.200: ICD-10-CM

## 2018-07-20 DIAGNOSIS — N18.6: ICD-10-CM

## 2018-07-20 DIAGNOSIS — Q05.9: ICD-10-CM

## 2018-07-20 DIAGNOSIS — Z71.6: ICD-10-CM

## 2018-07-20 DIAGNOSIS — N76.6: ICD-10-CM

## 2018-07-20 DIAGNOSIS — Z90.49: ICD-10-CM

## 2018-07-20 DIAGNOSIS — L89.322: Primary | ICD-10-CM

## 2018-07-20 DIAGNOSIS — L89.892: ICD-10-CM

## 2018-08-24 ENCOUNTER — HOSPITAL ENCOUNTER (OUTPATIENT)
Dept: HOSPITAL 83 - WOUNDCARE | Age: 48
Discharge: HOME | End: 2018-08-24
Attending: NURSE PRACTITIONER
Payer: MEDICARE

## 2018-08-24 DIAGNOSIS — Z71.6: ICD-10-CM

## 2018-08-24 DIAGNOSIS — Q05.9: ICD-10-CM

## 2018-08-24 DIAGNOSIS — N18.6: ICD-10-CM

## 2018-08-24 DIAGNOSIS — L89.892: ICD-10-CM

## 2018-08-24 DIAGNOSIS — L89.223: Primary | ICD-10-CM

## 2018-08-24 DIAGNOSIS — L89.322: ICD-10-CM

## 2018-08-24 DIAGNOSIS — N76.6: ICD-10-CM

## 2018-08-24 DIAGNOSIS — F17.200: ICD-10-CM

## 2018-08-31 ENCOUNTER — HOSPITAL ENCOUNTER (OUTPATIENT)
Dept: HOSPITAL 83 - WOUNDCARE | Age: 48
Discharge: HOME | End: 2018-08-31
Attending: NURSE PRACTITIONER
Payer: MEDICARE

## 2018-08-31 DIAGNOSIS — F17.200: ICD-10-CM

## 2018-08-31 DIAGNOSIS — L89.222: ICD-10-CM

## 2018-08-31 DIAGNOSIS — L89.892: ICD-10-CM

## 2018-08-31 DIAGNOSIS — N18.6: ICD-10-CM

## 2018-08-31 DIAGNOSIS — Z71.6: ICD-10-CM

## 2018-08-31 DIAGNOSIS — Q05.9: ICD-10-CM

## 2018-08-31 DIAGNOSIS — L89.322: Primary | ICD-10-CM

## 2018-09-06 ENCOUNTER — HOSPITAL ENCOUNTER (EMERGENCY)
Dept: HOSPITAL 83 - ED | Age: 48
LOS: 1 days | Discharge: HOME | End: 2018-09-07
Payer: MEDICARE

## 2018-09-06 VITALS — WEIGHT: 90 LBS | BODY MASS INDEX: 20.83 KG/M2 | HEIGHT: 55 IN

## 2018-09-06 VITALS — DIASTOLIC BLOOD PRESSURE: 93 MMHG

## 2018-09-06 DIAGNOSIS — Z79.899: ICD-10-CM

## 2018-09-06 DIAGNOSIS — Z93.6: ICD-10-CM

## 2018-09-06 DIAGNOSIS — F17.200: ICD-10-CM

## 2018-09-06 DIAGNOSIS — N39.0: ICD-10-CM

## 2018-09-06 DIAGNOSIS — N95.0: Primary | ICD-10-CM

## 2018-09-06 DIAGNOSIS — Z90.49: ICD-10-CM

## 2018-09-06 DIAGNOSIS — Z88.8: ICD-10-CM

## 2018-09-06 DIAGNOSIS — Z93.3: ICD-10-CM

## 2018-09-06 DIAGNOSIS — Z88.1: ICD-10-CM

## 2018-09-06 DIAGNOSIS — Z99.2: ICD-10-CM

## 2018-09-06 DIAGNOSIS — Z88.5: ICD-10-CM

## 2018-09-06 DIAGNOSIS — G82.20: ICD-10-CM

## 2018-09-06 LAB
ALBUMIN SERPL-MCNC: 2 GM/DL (ref 3.1–4.5)
ALP SERPL-CCNC: 119 U/L (ref 45–117)
ALT SERPL W P-5'-P-CCNC: 11 U/L (ref 12–78)
APPEARANCE UR: (no result)
AST SERPL-CCNC: 10 IU/L (ref 3–35)
BASOPHILS # BLD AUTO: 0.1 10*3/UL (ref 0–0.1)
BASOPHILS NFR BLD AUTO: 0.7 % (ref 0–1)
BILIRUB UR QL STRIP: NEGATIVE
BUN SERPL-MCNC: 38 MG/DL (ref 7–24)
CHLORIDE SERPL-SCNC: 93 MMOL/L (ref 98–107)
COLOR UR: YELLOW
CREAT SERPL-MCNC: 4.17 MG/DL (ref 0.55–1.02)
EOSINOPHIL # BLD AUTO: 0.5 10*3/UL (ref 0–0.4)
EOSINOPHIL # BLD AUTO: 6.3 % (ref 1–4)
ERYTHROCYTE [DISTWIDTH] IN BLOOD BY AUTOMATED COUNT: 14.9 % (ref 0–14.5)
GLUCOSE UR QL: NEGATIVE
HCT VFR BLD AUTO: 29.4 % (ref 37–47)
HGB BLD-MCNC: 9.4 G/DL (ref 12–16)
HGB UR QL STRIP: (no result)
KETONES UR QL STRIP: NEGATIVE
LEUKOCYTE ESTERASE UR QL STRIP: (no result)
LYMPHOCYTES # BLD AUTO: 0.4 10*3/UL (ref 1.3–4.4)
LYMPHOCYTES NFR BLD AUTO: 4.6 % (ref 27–41)
MCH RBC QN AUTO: 29.2 PG (ref 27–31)
MCHC RBC AUTO-ENTMCNC: 32 G/DL (ref 33–37)
MCV RBC AUTO: 91.3 FL (ref 81–99)
MONOCYTES # BLD AUTO: 1 10*3/UL (ref 0.1–1)
MONOCYTES NFR BLD MANUAL: 11.8 % (ref 3–9)
NEUT #: 6.1 10*3/UL (ref 2.3–7.9)
NEUT %: 76.2 % (ref 47–73)
NITRITE UR QL STRIP: NEGATIVE
NRBC BLD QL AUTO: 0 10*3/UL (ref 0–0)
PH UR STRIP: 8.5 [PH] (ref 5–9)
PLATELET # BLD AUTO: 182 10*3/UL (ref 130–400)
PMV BLD AUTO: 9.6 FL (ref 9.6–12.3)
POTASSIUM SERPL-SCNC: 4 MMOL/L (ref 3.5–5.1)
PROT SERPL-MCNC: 5.8 GM/DL (ref 6.4–8.2)
RBC # BLD AUTO: 3.22 10*6/UL (ref 4.1–5.1)
SODIUM SERPL-SCNC: 131 MMOL/L (ref 136–145)
SP GR UR: 1.01 (ref 1–1.03)
UROBILINOGEN UR STRIP-MCNC: 0.2 E.U./DL (ref 0.2–1)
WBC NRBC COR # BLD AUTO: 8 10*3/UL (ref 4.8–10.8)

## 2018-09-07 LAB
BACTERIA #/AREA URNS HPF: (no result) /[HPF]
RBC #/AREA URNS HPF: (no result) RBC/HPF (ref 0–2)
WBC #/AREA URNS HPF: (no result) WBC/HPF (ref 0–5)

## 2018-10-23 ENCOUNTER — HOSPITAL ENCOUNTER (INPATIENT)
Dept: HOSPITAL 83 - ED | Age: 48
LOS: 2 days | Discharge: HOME | DRG: 570 | End: 2018-10-25
Attending: INTERNAL MEDICINE | Admitting: INTERNAL MEDICINE
Payer: MEDICARE

## 2018-10-23 VITALS — DIASTOLIC BLOOD PRESSURE: 82 MMHG

## 2018-10-23 VITALS — WEIGHT: 94.44 LBS | BODY MASS INDEX: 21.86 KG/M2 | HEIGHT: 55 IN

## 2018-10-23 VITALS — DIASTOLIC BLOOD PRESSURE: 75 MMHG | SYSTOLIC BLOOD PRESSURE: 139 MMHG

## 2018-10-23 VITALS — DIASTOLIC BLOOD PRESSURE: 90 MMHG

## 2018-10-23 VITALS — DIASTOLIC BLOOD PRESSURE: 83 MMHG

## 2018-10-23 VITALS — SYSTOLIC BLOOD PRESSURE: 149 MMHG | DIASTOLIC BLOOD PRESSURE: 83 MMHG

## 2018-10-23 DIAGNOSIS — Z84.89: ICD-10-CM

## 2018-10-23 DIAGNOSIS — Z83.6: ICD-10-CM

## 2018-10-23 DIAGNOSIS — D64.9: ICD-10-CM

## 2018-10-23 DIAGNOSIS — Z82.0: ICD-10-CM

## 2018-10-23 DIAGNOSIS — N18.6: ICD-10-CM

## 2018-10-23 DIAGNOSIS — E87.8: ICD-10-CM

## 2018-10-23 DIAGNOSIS — Z86.73: ICD-10-CM

## 2018-10-23 DIAGNOSIS — F17.210: ICD-10-CM

## 2018-10-23 DIAGNOSIS — Z91.048: ICD-10-CM

## 2018-10-23 DIAGNOSIS — L89.224: Primary | ICD-10-CM

## 2018-10-23 DIAGNOSIS — Z88.5: ICD-10-CM

## 2018-10-23 DIAGNOSIS — Z87.440: ICD-10-CM

## 2018-10-23 DIAGNOSIS — F41.8: ICD-10-CM

## 2018-10-23 DIAGNOSIS — E43: ICD-10-CM

## 2018-10-23 DIAGNOSIS — Z99.2: ICD-10-CM

## 2018-10-23 DIAGNOSIS — Z88.8: ICD-10-CM

## 2018-10-23 DIAGNOSIS — M19.90: ICD-10-CM

## 2018-10-23 DIAGNOSIS — M54.9: ICD-10-CM

## 2018-10-23 DIAGNOSIS — Z90.5: ICD-10-CM

## 2018-10-23 DIAGNOSIS — Z82.5: ICD-10-CM

## 2018-10-23 DIAGNOSIS — E87.1: ICD-10-CM

## 2018-10-23 DIAGNOSIS — Z82.49: ICD-10-CM

## 2018-10-23 DIAGNOSIS — K21.9: ICD-10-CM

## 2018-10-23 DIAGNOSIS — Z87.01: ICD-10-CM

## 2018-10-23 DIAGNOSIS — Z88.1: ICD-10-CM

## 2018-10-23 DIAGNOSIS — G89.29: ICD-10-CM

## 2018-10-23 DIAGNOSIS — L89.323: ICD-10-CM

## 2018-10-23 DIAGNOSIS — Q05.4: ICD-10-CM

## 2018-10-23 DIAGNOSIS — G43.909: ICD-10-CM

## 2018-10-23 DIAGNOSIS — Z86.718: ICD-10-CM

## 2018-10-23 DIAGNOSIS — Z90.49: ICD-10-CM

## 2018-10-23 DIAGNOSIS — Z98.1: ICD-10-CM

## 2018-10-23 DIAGNOSIS — Z79.899: ICD-10-CM

## 2018-10-23 DIAGNOSIS — N95.0: ICD-10-CM

## 2018-10-23 DIAGNOSIS — I12.0: ICD-10-CM

## 2018-10-23 LAB
ALBUMIN SERPL-MCNC: 2.1 GM/DL (ref 3.1–4.5)
ALP SERPL-CCNC: 123 U/L (ref 45–117)
ALT SERPL W P-5'-P-CCNC: 12 U/L (ref 12–78)
APTT PPP: 27.1 SECONDS (ref 20.8–31.5)
AST SERPL-CCNC: 11 IU/L (ref 3–35)
BASOPHILS # BLD AUTO: 0.1 10*3/UL (ref 0–0.1)
BASOPHILS NFR BLD AUTO: 0.7 % (ref 0–1)
BUN SERPL-MCNC: 74 MG/DL (ref 7–24)
CHLORIDE SERPL-SCNC: 97 MMOL/L (ref 98–107)
CREAT SERPL-MCNC: 6.51 MG/DL (ref 0.55–1.02)
EOSINOPHIL # BLD AUTO: 0.7 10*3/UL (ref 0–0.4)
EOSINOPHIL # BLD AUTO: 6.4 % (ref 1–4)
ERYTHROCYTE [DISTWIDTH] IN BLOOD BY AUTOMATED COUNT: 17.2 % (ref 0–14.5)
HCT VFR BLD AUTO: 23.3 % (ref 37–47)
HCT VFR BLD AUTO: 27 % (ref 37–47)
HGB BLD-MCNC: 7.3 G/DL (ref 12–16)
HGB BLD-MCNC: 8.8 G/DL (ref 12–16)
INR BLD: 1.1 (ref 2–3.5)
LYMPHOCYTES # BLD AUTO: 0.4 10*3/UL (ref 1.3–4.4)
LYMPHOCYTES NFR BLD AUTO: 3.2 % (ref 27–41)
MCH RBC QN AUTO: 29.6 PG (ref 27–31)
MCHC RBC AUTO-ENTMCNC: 31.3 G/DL (ref 33–37)
MCV RBC AUTO: 94.3 FL (ref 81–99)
MONOCYTES # BLD AUTO: 0.7 10*3/UL (ref 0.1–1)
MONOCYTES NFR BLD MANUAL: 5.9 % (ref 3–9)
NEUT #: 9.7 10*3/UL (ref 2.3–7.9)
NEUT %: 83.3 % (ref 47–73)
NRBC BLD QL AUTO: 0 % (ref 0–0)
PLATELET # BLD AUTO: 181 10*3/UL (ref 130–400)
PMV BLD AUTO: 10 FL (ref 9.6–12.3)
POTASSIUM SERPL-SCNC: 5.1 MMOL/L (ref 3.5–5.1)
PROT SERPL-MCNC: 5.6 GM/DL (ref 6.4–8.2)
RBC # BLD AUTO: 2.47 10*6/UL (ref 4.1–5.1)
SODIUM SERPL-SCNC: 134 MMOL/L (ref 136–145)
WBC NRBC COR # BLD AUTO: 11.6 10*3/UL (ref 4.8–10.8)

## 2018-10-23 PROCEDURE — 5A1D70Z PERFORMANCE OF URINARY FILTRATION, INTERMITTENT, LESS THAN 6 HOURS PER DAY: ICD-10-PCS | Performed by: NURSE PRACTITIONER

## 2018-10-23 PROCEDURE — 30233N1 TRANSFUSION OF NONAUTOLOGOUS RED BLOOD CELLS INTO PERIPHERAL VEIN, PERCUTANEOUS APPROACH: ICD-10-PCS | Performed by: NURSE PRACTITIONER

## 2018-10-24 VITALS — SYSTOLIC BLOOD PRESSURE: 149 MMHG | DIASTOLIC BLOOD PRESSURE: 74 MMHG

## 2018-10-24 VITALS — SYSTOLIC BLOOD PRESSURE: 143 MMHG | DIASTOLIC BLOOD PRESSURE: 76 MMHG

## 2018-10-24 VITALS — SYSTOLIC BLOOD PRESSURE: 138 MMHG | DIASTOLIC BLOOD PRESSURE: 79 MMHG

## 2018-10-24 VITALS — DIASTOLIC BLOOD PRESSURE: 74 MMHG | SYSTOLIC BLOOD PRESSURE: 148 MMHG

## 2018-10-24 VITALS — DIASTOLIC BLOOD PRESSURE: 74 MMHG

## 2018-10-24 LAB
25(OH)D3 SERPL-MCNC: 57.4 NG/ML (ref 30–100)
ALBUMIN SERPL-MCNC: 1.9 GM/DL (ref 3.1–4.5)
ALP SERPL-CCNC: 109 U/L (ref 45–117)
ALT SERPL W P-5'-P-CCNC: 10 U/L (ref 12–78)
AST SERPL-CCNC: 13 IU/L (ref 3–35)
BASOPHILS # BLD AUTO: 0.1 10*3/UL (ref 0–0.1)
BASOPHILS NFR BLD AUTO: 0.5 % (ref 0–1)
BUN SERPL-MCNC: 44 MG/DL (ref 7–24)
CHLORIDE SERPL-SCNC: 98 MMOL/L (ref 98–107)
CHOLEST SERPL-MCNC: 112 MG/DL (ref ?–200)
CREAT SERPL-MCNC: 4.18 MG/DL (ref 0.55–1.02)
EOSINOPHIL # BLD AUTO: 0.6 10*3/UL (ref 0–0.4)
EOSINOPHIL # BLD AUTO: 5.3 % (ref 1–4)
ERYTHROCYTE [DISTWIDTH] IN BLOOD BY AUTOMATED COUNT: 17.8 % (ref 0–14.5)
HCT VFR BLD AUTO: 25.3 % (ref 37–47)
HCT VFR BLD AUTO: 25.5 % (ref 37–47)
HDLC SERPL-MCNC: 44 MG/DL (ref 40–60)
HGB BLD-MCNC: 8 G/DL (ref 12–16)
HGB BLD-MCNC: 8.1 G/DL (ref 12–16)
LDLC SERPL DIRECT ASSAY-MCNC: 59 MG/DL (ref 9–159)
LYMPHOCYTES # BLD AUTO: 0.3 10*3/UL (ref 1.3–4.4)
LYMPHOCYTES NFR BLD AUTO: 2.4 % (ref 27–41)
MCH RBC QN AUTO: 28.6 PG (ref 27–31)
MCHC RBC AUTO-ENTMCNC: 31.6 G/DL (ref 33–37)
MCV RBC AUTO: 90.4 FL (ref 81–99)
MONOCYTES # BLD AUTO: 0.9 10*3/UL (ref 0.1–1)
MONOCYTES NFR BLD MANUAL: 8.6 % (ref 3–9)
NEUT #: 8.6 10*3/UL (ref 2.3–7.9)
NEUT %: 82.9 % (ref 47–73)
NRBC BLD QL AUTO: 0 % (ref 0–0)
PHOSPHATE SERPL-MCNC: 6 MG/DL (ref 2.5–4.9)
PLATELET # BLD AUTO: 152 10*3/UL (ref 130–400)
PMV BLD AUTO: 10.3 FL (ref 9.6–12.3)
POTASSIUM SERPL-SCNC: 4.1 MMOL/L (ref 3.5–5.1)
PROT SERPL-MCNC: 5 GM/DL (ref 6.4–8.2)
RBC # BLD AUTO: 2.8 10*6/UL (ref 4.1–5.1)
SODIUM SERPL-SCNC: 136 MMOL/L (ref 136–145)
T4 FREE SERPL-MCNC: 1.27 NG/DL (ref 0.76–1.46)
TRIGL SERPL-MCNC: 47 MG/DL (ref ?–150)
TSH SERPL DL<=0.005 MIU/L-ACNC: 1.23 UIU/ML (ref 0.36–4.75)
VITAMIN B12: 483 PG/ML (ref 247–911)
VLDLC SERPL CALC-MCNC: 9 MG/DL (ref 6–40)
WBC NRBC COR # BLD AUTO: 10.4 10*3/UL (ref 4.8–10.8)

## 2018-10-24 PROCEDURE — 0JBM0ZZ EXCISION OF LEFT UPPER LEG SUBCUTANEOUS TISSUE AND FASCIA, OPEN APPROACH: ICD-10-PCS | Performed by: NURSE PRACTITIONER

## 2018-10-25 VITALS — DIASTOLIC BLOOD PRESSURE: 74 MMHG

## 2018-10-25 VITALS — SYSTOLIC BLOOD PRESSURE: 139 MMHG | DIASTOLIC BLOOD PRESSURE: 66 MMHG

## 2018-10-25 VITALS — DIASTOLIC BLOOD PRESSURE: 79 MMHG

## 2018-10-25 LAB
ALBUMIN SERPL-MCNC: 1.9 GM/DL (ref 3.1–4.5)
BASOPHILS # BLD AUTO: 0.1 10*3/UL (ref 0–0.1)
BASOPHILS NFR BLD AUTO: 0.7 % (ref 0–1)
BUN SERPL-MCNC: 47 MG/DL (ref 7–24)
CHLORIDE SERPL-SCNC: 100 MMOL/L (ref 98–107)
CREAT SERPL-MCNC: 4.63 MG/DL (ref 0.55–1.02)
EOSINOPHIL # BLD AUTO: 0.3 10*3/UL (ref 0–0.4)
EOSINOPHIL # BLD AUTO: 3.7 % (ref 1–4)
ERYTHROCYTE [DISTWIDTH] IN BLOOD BY AUTOMATED COUNT: 17.7 % (ref 0–14.5)
HCT VFR BLD AUTO: 24.6 % (ref 37–47)
HGB BLD-MCNC: 7.8 G/DL (ref 12–16)
LYMPHOCYTES # BLD AUTO: 0.3 10*3/UL (ref 1.3–4.4)
LYMPHOCYTES NFR BLD AUTO: 3 % (ref 27–41)
MCH RBC QN AUTO: 28.8 PG (ref 27–31)
MCHC RBC AUTO-ENTMCNC: 31.7 G/DL (ref 33–37)
MCV RBC AUTO: 90.8 FL (ref 81–99)
MONOCYTES # BLD AUTO: 0.9 10*3/UL (ref 0.1–1)
MONOCYTES NFR BLD MANUAL: 10.2 % (ref 3–9)
NEUT #: 7.5 10*3/UL (ref 2.3–7.9)
NEUT %: 82.1 % (ref 47–73)
NRBC BLD QL AUTO: 0 10*3/UL (ref 0–0)
PHOSPHATE SERPL-MCNC: 6.9 MG/DL (ref 2.5–4.9)
PLATELET # BLD AUTO: 127 10*3/UL (ref 130–400)
PMV BLD AUTO: 10.2 FL (ref 9.6–12.3)
POTASSIUM SERPL-SCNC: 4.8 MMOL/L (ref 3.5–5.1)
RBC # BLD AUTO: 2.71 10*6/UL (ref 4.1–5.1)
SODIUM SERPL-SCNC: 134 MMOL/L (ref 136–145)
WBC NRBC COR # BLD AUTO: 9.1 10*3/UL (ref 4.8–10.8)

## 2018-10-26 ENCOUNTER — HOSPITAL ENCOUNTER (OUTPATIENT)
Dept: HOSPITAL 83 - MAMMO | Age: 48
Discharge: HOME | End: 2018-10-26
Attending: OBSTETRICS & GYNECOLOGY
Payer: MEDICARE

## 2018-10-26 DIAGNOSIS — Z12.31: Primary | ICD-10-CM

## 2018-10-26 DIAGNOSIS — N64.89: ICD-10-CM

## 2018-11-02 ENCOUNTER — HOSPITAL ENCOUNTER (OUTPATIENT)
Dept: HOSPITAL 83 - WOUNDCARE | Age: 48
Discharge: HOME | End: 2018-11-02
Attending: NURSE PRACTITIONER
Payer: MEDICARE

## 2018-11-02 DIAGNOSIS — M54.9: ICD-10-CM

## 2018-11-02 DIAGNOSIS — F41.9: ICD-10-CM

## 2018-11-02 DIAGNOSIS — G89.29: ICD-10-CM

## 2018-11-02 DIAGNOSIS — G47.00: ICD-10-CM

## 2018-11-02 DIAGNOSIS — Q05.9: ICD-10-CM

## 2018-11-02 DIAGNOSIS — L89.323: Primary | ICD-10-CM

## 2018-11-02 DIAGNOSIS — L89.893: ICD-10-CM

## 2018-11-02 DIAGNOSIS — M19.90: ICD-10-CM

## 2018-11-02 DIAGNOSIS — K21.9: ICD-10-CM

## 2018-11-02 DIAGNOSIS — F17.210: ICD-10-CM

## 2018-11-02 DIAGNOSIS — Z71.6: ICD-10-CM

## 2018-11-02 DIAGNOSIS — N18.6: ICD-10-CM

## 2018-11-02 DIAGNOSIS — F32.9: ICD-10-CM

## 2018-11-02 DIAGNOSIS — Z86.718: ICD-10-CM

## 2018-11-02 DIAGNOSIS — Z86.73: ICD-10-CM

## 2018-11-26 ENCOUNTER — HOSPITAL ENCOUNTER (OUTPATIENT)
Dept: HOSPITAL 83 - WOUNDCARE | Age: 48
Discharge: HOME | End: 2018-11-26
Attending: NURSE PRACTITIONER
Payer: MEDICARE

## 2018-11-26 DIAGNOSIS — Z99.2: ICD-10-CM

## 2018-11-26 DIAGNOSIS — L89.893: ICD-10-CM

## 2018-11-26 DIAGNOSIS — N18.6: ICD-10-CM

## 2018-11-26 DIAGNOSIS — L89.323: Primary | ICD-10-CM

## 2018-11-26 DIAGNOSIS — F17.200: ICD-10-CM

## 2018-11-26 DIAGNOSIS — Q05.9: ICD-10-CM

## 2018-11-26 DIAGNOSIS — Z71.6: ICD-10-CM

## 2018-12-07 ENCOUNTER — HOSPITAL ENCOUNTER (EMERGENCY)
Dept: HOSPITAL 83 - ED | Age: 48
LOS: 1 days | Discharge: ANOTHER HOSPITAL | End: 2018-12-08
Payer: MEDICARE

## 2018-12-07 VITALS — SYSTOLIC BLOOD PRESSURE: 122 MMHG | DIASTOLIC BLOOD PRESSURE: 80 MMHG

## 2018-12-07 VITALS — DIASTOLIC BLOOD PRESSURE: 63 MMHG | SYSTOLIC BLOOD PRESSURE: 120 MMHG

## 2018-12-07 VITALS — DIASTOLIC BLOOD PRESSURE: 74 MMHG

## 2018-12-07 VITALS — DIASTOLIC BLOOD PRESSURE: 81 MMHG

## 2018-12-07 VITALS — SYSTOLIC BLOOD PRESSURE: 130 MMHG | DIASTOLIC BLOOD PRESSURE: 84 MMHG

## 2018-12-07 VITALS — WEIGHT: 85 LBS | HEIGHT: 55 IN

## 2018-12-07 VITALS — DIASTOLIC BLOOD PRESSURE: 93 MMHG

## 2018-12-07 VITALS — DIASTOLIC BLOOD PRESSURE: 91 MMHG

## 2018-12-07 DIAGNOSIS — F17.200: ICD-10-CM

## 2018-12-07 DIAGNOSIS — Z90.49: ICD-10-CM

## 2018-12-07 DIAGNOSIS — Z86.718: ICD-10-CM

## 2018-12-07 DIAGNOSIS — L89.899: ICD-10-CM

## 2018-12-07 DIAGNOSIS — G43.909: ICD-10-CM

## 2018-12-07 DIAGNOSIS — Z93.3: ICD-10-CM

## 2018-12-07 DIAGNOSIS — Z88.8: ICD-10-CM

## 2018-12-07 DIAGNOSIS — N18.6: ICD-10-CM

## 2018-12-07 DIAGNOSIS — L89.223: ICD-10-CM

## 2018-12-07 DIAGNOSIS — I12.0: ICD-10-CM

## 2018-12-07 DIAGNOSIS — Z99.2: ICD-10-CM

## 2018-12-07 DIAGNOSIS — K21.9: ICD-10-CM

## 2018-12-07 DIAGNOSIS — Z86.73: ICD-10-CM

## 2018-12-07 DIAGNOSIS — Z88.1: ICD-10-CM

## 2018-12-07 DIAGNOSIS — Z88.5: ICD-10-CM

## 2018-12-07 DIAGNOSIS — L08.9: ICD-10-CM

## 2018-12-07 DIAGNOSIS — L89.154: Primary | ICD-10-CM

## 2018-12-07 DIAGNOSIS — G89.29: ICD-10-CM

## 2018-12-07 DIAGNOSIS — Z79.899: ICD-10-CM

## 2018-12-07 LAB
ACANTHOCYTES BLD QL SMEAR: (no result)
ALBUMIN SERPL-MCNC: 1.6 GM/DL (ref 3.1–4.5)
ALP SERPL-CCNC: 99 U/L (ref 45–117)
ALT SERPL W P-5'-P-CCNC: 12 U/L (ref 12–78)
APPEARANCE UR: (no result)
AST SERPL-CCNC: 13 IU/L (ref 3–35)
BACTERIA #/AREA URNS HPF: (no result) /[HPF]
BILIRUB UR QL STRIP: NEGATIVE
BUN SERPL-MCNC: 82 MG/DL (ref 7–24)
BURR CELLS BLD QL SMEAR: (no result)
CHLORIDE SERPL-SCNC: 105 MMOL/L (ref 98–107)
COLOR UR: YELLOW
CREAT SERPL-MCNC: 7.11 MG/DL (ref 0.55–1.02)
ERYTHROCYTE [DISTWIDTH] IN BLOOD BY AUTOMATED COUNT: 16.5 % (ref 0–14.5)
GLUCOSE UR QL: NEGATIVE
HCT VFR BLD AUTO: 30.9 % (ref 37–47)
HGB BLD-MCNC: 9.4 G/DL (ref 12–16)
HGB UR QL STRIP: (no result)
KETONES UR QL STRIP: NEGATIVE
LEUKOCYTE ESTERASE UR QL STRIP: (no result)
MCH RBC QN AUTO: 27.1 PG (ref 27–31)
MCHC RBC AUTO-ENTMCNC: 30.4 G/DL (ref 33–37)
MCV RBC AUTO: 89 FL (ref 81–99)
MUCOUS THREADS URNS QL MICRO: (no result)
NITRITE UR QL STRIP: NEGATIVE
NRBC BLD QL AUTO: 0 % (ref 0–0)
PH UR STRIP: >= 9 [PH] (ref 5–9)
PLATELET # BLD AUTO: 175 10*3/UL (ref 130–400)
PLATELET SUFFICIENCY: NORMAL
PMV BLD AUTO: 10.6 FL (ref 9.6–12.3)
POLYCHROMASIA BLD QL SMEAR: SLIGHT
POTASSIUM SERPL-SCNC: 5 MMOL/L (ref 3.5–5.1)
PROT SERPL-MCNC: 5.9 GM/DL (ref 6.4–8.2)
RBC # BLD AUTO: 3.47 10*6/UL (ref 4.1–5.1)
RBC #/AREA URNS HPF: (no result) RBC/HPF (ref 0–2)
SCHISTOCYTES BLD QL SMEAR: (no result)
SODIUM SERPL-SCNC: 140 MMOL/L (ref 136–145)
SP GR UR: 1.02 (ref 1–1.03)
TOTAL CELLS COUNTED: 100 #CELLS
UROBILINOGEN UR STRIP-MCNC: 0.2 E.U./DL (ref 0.2–1)
WBC NRBC COR # BLD AUTO: 32.2 10*3/UL (ref 4.8–10.8)

## 2018-12-08 VITALS — SYSTOLIC BLOOD PRESSURE: 102 MMHG | DIASTOLIC BLOOD PRESSURE: 60 MMHG

## 2019-01-31 ENCOUNTER — HOSPITAL ENCOUNTER (INPATIENT)
Dept: HOSPITAL 83 - ED | Age: 49
LOS: 1 days | Discharge: ANOTHER HOSPITAL | DRG: 871 | End: 2019-02-01
Attending: INTERNAL MEDICINE | Admitting: INTERNAL MEDICINE
Payer: MEDICARE

## 2019-01-31 ENCOUNTER — HOSPITAL ENCOUNTER (OUTPATIENT)
Dept: HOSPITAL 83 - LAB | Age: 49
Discharge: HOME | End: 2019-01-31
Attending: FAMILY MEDICINE
Payer: MEDICARE

## 2019-01-31 VITALS — DIASTOLIC BLOOD PRESSURE: 54 MMHG

## 2019-01-31 VITALS — HEIGHT: 55 IN | BODY MASS INDEX: 18.55 KG/M2 | WEIGHT: 80.13 LBS

## 2019-01-31 VITALS — DIASTOLIC BLOOD PRESSURE: 50 MMHG

## 2019-01-31 DIAGNOSIS — R18.8: ICD-10-CM

## 2019-01-31 DIAGNOSIS — G89.29: ICD-10-CM

## 2019-01-31 DIAGNOSIS — K21.9: ICD-10-CM

## 2019-01-31 DIAGNOSIS — E87.8: ICD-10-CM

## 2019-01-31 DIAGNOSIS — Z90.5: ICD-10-CM

## 2019-01-31 DIAGNOSIS — N18.6: ICD-10-CM

## 2019-01-31 DIAGNOSIS — R65.20: ICD-10-CM

## 2019-01-31 DIAGNOSIS — L08.9: ICD-10-CM

## 2019-01-31 DIAGNOSIS — A41.9: Primary | ICD-10-CM

## 2019-01-31 DIAGNOSIS — Z79.899: ICD-10-CM

## 2019-01-31 DIAGNOSIS — R74.8: ICD-10-CM

## 2019-01-31 DIAGNOSIS — E83.51: ICD-10-CM

## 2019-01-31 DIAGNOSIS — Z86.73: ICD-10-CM

## 2019-01-31 DIAGNOSIS — G43.909: ICD-10-CM

## 2019-01-31 DIAGNOSIS — L03.90: ICD-10-CM

## 2019-01-31 DIAGNOSIS — R80.9: ICD-10-CM

## 2019-01-31 DIAGNOSIS — K52.9: Primary | ICD-10-CM

## 2019-01-31 DIAGNOSIS — Z82.49: ICD-10-CM

## 2019-01-31 DIAGNOSIS — Z82.5: ICD-10-CM

## 2019-01-31 DIAGNOSIS — Z84.89: ICD-10-CM

## 2019-01-31 DIAGNOSIS — R31.21: ICD-10-CM

## 2019-01-31 DIAGNOSIS — Z88.1: ICD-10-CM

## 2019-01-31 DIAGNOSIS — M19.90: ICD-10-CM

## 2019-01-31 DIAGNOSIS — Z82.0: ICD-10-CM

## 2019-01-31 DIAGNOSIS — Z86.718: ICD-10-CM

## 2019-01-31 DIAGNOSIS — Z88.8: ICD-10-CM

## 2019-01-31 DIAGNOSIS — D64.9: ICD-10-CM

## 2019-01-31 DIAGNOSIS — E16.2: ICD-10-CM

## 2019-01-31 DIAGNOSIS — Z99.2: ICD-10-CM

## 2019-01-31 DIAGNOSIS — E87.5: ICD-10-CM

## 2019-01-31 DIAGNOSIS — I12.0: ICD-10-CM

## 2019-01-31 DIAGNOSIS — M54.9: ICD-10-CM

## 2019-01-31 DIAGNOSIS — F41.8: ICD-10-CM

## 2019-01-31 DIAGNOSIS — G47.00: ICD-10-CM

## 2019-01-31 DIAGNOSIS — E43: ICD-10-CM

## 2019-01-31 DIAGNOSIS — L89.150: ICD-10-CM

## 2019-01-31 DIAGNOSIS — Z88.5: ICD-10-CM

## 2019-01-31 DIAGNOSIS — Z90.49: ICD-10-CM

## 2019-01-31 LAB
ALBUMIN SERPL-MCNC: 1.6 GM/DL (ref 3.1–4.5)
ALP SERPL-CCNC: 128 U/L (ref 45–117)
ALT SERPL W P-5'-P-CCNC: 11 U/L (ref 12–78)
APPEARANCE UR: (no result)
AST SERPL-CCNC: 23 IU/L (ref 3–35)
BACTERIA #/AREA URNS HPF: (no result) /[HPF]
BILIRUB UR QL STRIP: NEGATIVE
BUN SERPL-MCNC: 93 MG/DL (ref 7–24)
BURR CELLS BLD QL SMEAR: (no result)
CHLORIDE SERPL-SCNC: 115 MMOL/L (ref 98–107)
COLOR UR: YELLOW
CREAT SERPL-MCNC: 8.61 MG/DL (ref 0.55–1.02)
ERYTHROCYTE [DISTWIDTH] IN BLOOD BY AUTOMATED COUNT: 18.1 % (ref 0–14.5)
GLUCOSE UR QL: NEGATIVE
HCT VFR BLD AUTO: 26.8 % (ref 37–47)
HGB BLD-MCNC: 8.3 G/DL (ref 12–16)
HGB UR QL STRIP: (no result)
KETONES UR QL STRIP: NEGATIVE
LEUKOCYTE ESTERASE UR QL STRIP: NEGATIVE
MCH RBC QN AUTO: 28.9 PG (ref 27–31)
MCHC RBC AUTO-ENTMCNC: 31 G/DL (ref 33–37)
MCV RBC AUTO: 93.4 FL (ref 81–99)
NITRITE UR QL STRIP: NEGATIVE
NRBC BLD QL AUTO: 0 % (ref 0–0)
PH UR STRIP: 8 [PH] (ref 5–9)
PLATELET # BLD AUTO: 139 10*3/UL (ref 130–400)
PLATELET SUFFICIENCY: NORMAL
PMV BLD AUTO: 11 FL (ref 9.6–12.3)
POTASSIUM SERPL-SCNC: 6 MMOL/L (ref 3.5–5.1)
PROT SERPL-MCNC: 5.5 GM/DL (ref 6.4–8.2)
RBC # BLD AUTO: 2.87 10*6/UL (ref 4.1–5.1)
RBC #/AREA URNS HPF: (no result) RBC/HPF (ref 0–2)
SODIUM SERPL-SCNC: 143 MMOL/L (ref 136–145)
SP GR UR: 1.02 (ref 1–1.03)
TOTAL CELLS COUNTED: 100 #CELLS
TROPONIN I SERPL-MCNC: < 0.015 NG/ML (ref ?–0.04)
UROBILINOGEN UR STRIP-MCNC: 0.2 E.U./DL (ref 0.2–1)
WBC #/AREA URNS HPF: (no result) WBC/HPF (ref 0–5)
WBC NRBC COR # BLD AUTO: 29.5 10*3/UL (ref 4.8–10.8)

## 2019-01-31 NOTE — EKG
[place], [place]
 
                               ELECTROCARDIOGRAM REPORT
 
        NAME: TWAN ARRIAGA                 ACCT #: [id number]  
        UNIT #: [id number]                        ROOM: [facility]    
        DOCTOR: ROSALIE DRAFT REPORT          BIRTHDATE: [birth year]
 
 
 

 
 
                           [facility]
                                       
Test Date:    2019               Test Time:    20:41:56
Pat Name:     TWAN ARRIAGA           Department:   
Patient ID:   [id number]             Room:         [facility]
Gender:       F                        Technician:   Krystyna Martines
:          1970               Requested By: PARDEEP MURRAY
Order Number: [id number]      [place] MD:   Davie Ray MD
                                 Measurements
Intervals                              Axis          
Rate:         106                      P:            41
MS:           151                      QRS:          104
QRSD:         121                      T:            8
QT:           387                                    
QTc:          514                                    
                           Interpretive Statements
Sinus tachycardia
RBBB and LPFB
 
 
Electronically Signed On 2019 8:51:21 PST by Davie Ray MD
 
CM:EKGRPT:ELECTROCARDIOGRAM REPORT
 
D: 19
T: 19 0851
    
PARDEEP MCCRACKEN DRAFT REPORT         
PARDEEP MURRAY DO

## 2019-01-31 NOTE — NUR
PT INITALLY UNSECC X1 PT REFUSES ANY FURTHER IV ATTEMPTS DR MURRAY SPOKE
WITH PT CONTINUED TO REFUSE CENTRAL LINE, PT SPOKE WITH CARETAKER ALONE NO PT
AGREES TO CENTRAL LINE ATTEMPT, NEED AND RISKS COVERED WITH PT AND CARETAKER BY
DR MURRAY

## 2019-02-01 VITALS — SYSTOLIC BLOOD PRESSURE: 104 MMHG | DIASTOLIC BLOOD PRESSURE: 52 MMHG

## 2019-02-01 VITALS — DIASTOLIC BLOOD PRESSURE: 54 MMHG

## 2019-02-01 VITALS — DIASTOLIC BLOOD PRESSURE: 70 MMHG

## 2019-02-01 NOTE — NUR
Occupational Therapy referral received and chart review completed. Patient to
be transferred to [facility] when bed available. Discharge OT.
Princess Mcgill OTR/LESLIE

## 2019-02-01 NOTE — NUR
A 48, admitted to ICCU, under the
services of ISIAH Mckeon DO with a diagnosis of SEPTIC SHOCK,
CELLULITUS.
Chief complaint is BACK PAIN, RIB PAIN, WEAKNESS.
Patient arrived via stretcher from ER.
Monitor applied. Initial assessment completed.
Vital signs taken and recorded.
ISIAH MCKEON DO notified of admission to the unit.
Orders received.
See assessment for past medical history, medications
and allergies.
Patient and/or family oriented to unit. [facility]
visitation policy reviewed.
Clothing/patient valuable form completed.
 
MORRO PALACIOS

## 2019-02-01 NOTE — NUR
PT HEARD CRYING "HELP". ON ASSESSMENT SHE C/O "BACK PAIN" "SO BAD". TURNED TO
HER OPPOSITE SIDE. HER LEFT HIP WOUND/COCCYX DRAINING SEROSANGUINOUS DRAINAGE.
UROSTOMY AND COLOSTOMY BAGS IN PLACE. IO IN LEFT LOWER LEG INFUSING SALINE AT
80/HR. TYLENOL BY MOUTH GIVEN FOR DISCOMFORT. NURSE FROM [facility] CALLED ME
AND UPDATED REPORT GIVEN. STILL AWAITING [facility] FOR TRANSPORT.

## 2019-02-01 NOTE — NUR
PATIENT TO BE TRANSFERED TO [facility].  RYAN CARETAKER AWARE AS WELL AS DR. CORDOVA
AND SUPERVISOR.  AWAITING A BED ASSIGNMENT.

## 2019-02-01 NOTE — NUR
PHYSICAL THERAPY
Orders received. PAtient being transferred to [facility]. Thank you for this
referral. Anais Baltazar,PT

## 2019-02-01 NOTE — NUR
ART MOBLEY AWARE OF POSITIVE BLOOD CULTURES.  NURSE TO NURSE REPORT GIVEN TO
[facility].

## 2019-02-01 NOTE — NUR
DR HANNAH NOTIFIED THAT 2ND BLOOD CULTURE IS POSITIVE FOR [facility], AND [facility] IN
PRS/CHAINS (THE SAME AS FIRST SET).

## 2019-02-14 ENCOUNTER — HOSPITAL ENCOUNTER (EMERGENCY)
Dept: HOSPITAL 83 - ED | Age: 49
Discharge: HOME | End: 2019-02-14
Payer: MEDICARE

## 2019-02-14 VITALS — SYSTOLIC BLOOD PRESSURE: 176 MMHG | DIASTOLIC BLOOD PRESSURE: 92 MMHG

## 2019-02-14 DIAGNOSIS — Z90.49: ICD-10-CM

## 2019-02-14 DIAGNOSIS — I12.0: ICD-10-CM

## 2019-02-14 DIAGNOSIS — G43.909: ICD-10-CM

## 2019-02-14 DIAGNOSIS — Z88.1: ICD-10-CM

## 2019-02-14 DIAGNOSIS — Z86.718: ICD-10-CM

## 2019-02-14 DIAGNOSIS — M19.90: ICD-10-CM

## 2019-02-14 DIAGNOSIS — G89.29: ICD-10-CM

## 2019-02-14 DIAGNOSIS — Z88.8: ICD-10-CM

## 2019-02-14 DIAGNOSIS — Z86.73: ICD-10-CM

## 2019-02-14 DIAGNOSIS — Z98.890: ICD-10-CM

## 2019-02-14 DIAGNOSIS — F17.200: ICD-10-CM

## 2019-02-14 DIAGNOSIS — Z88.5: ICD-10-CM

## 2019-02-14 DIAGNOSIS — N18.6: ICD-10-CM

## 2019-02-14 DIAGNOSIS — Z79.899: ICD-10-CM

## 2019-02-14 DIAGNOSIS — Z99.2: ICD-10-CM

## 2019-02-14 DIAGNOSIS — Z43.1: Primary | ICD-10-CM

## 2019-02-15 ENCOUNTER — HOSPITAL ENCOUNTER (EMERGENCY)
Dept: HOSPITAL 83 - ED | Age: 49
End: 2019-02-15
Payer: MEDICARE

## 2019-02-15 VITALS — SYSTOLIC BLOOD PRESSURE: 121 MMHG | DIASTOLIC BLOOD PRESSURE: 76 MMHG

## 2019-02-15 VITALS — HEIGHT: 55 IN | WEIGHT: 60 LBS

## 2019-02-15 DIAGNOSIS — K21.9: ICD-10-CM

## 2019-02-15 DIAGNOSIS — I12.0: ICD-10-CM

## 2019-02-15 DIAGNOSIS — Z99.2: ICD-10-CM

## 2019-02-15 DIAGNOSIS — G89.29: ICD-10-CM

## 2019-02-15 DIAGNOSIS — Z88.1: ICD-10-CM

## 2019-02-15 DIAGNOSIS — Z86.718: ICD-10-CM

## 2019-02-15 DIAGNOSIS — F17.200: ICD-10-CM

## 2019-02-15 DIAGNOSIS — N18.6: ICD-10-CM

## 2019-02-15 DIAGNOSIS — Z86.73: ICD-10-CM

## 2019-02-15 DIAGNOSIS — Z90.49: ICD-10-CM

## 2019-02-15 DIAGNOSIS — Z88.5: ICD-10-CM

## 2019-02-15 DIAGNOSIS — Z93.3: ICD-10-CM

## 2019-02-15 DIAGNOSIS — Z88.8: ICD-10-CM

## 2019-02-15 DIAGNOSIS — G43.909: ICD-10-CM

## 2019-02-15 DIAGNOSIS — Z79.899: ICD-10-CM

## 2019-02-15 DIAGNOSIS — M19.90: ICD-10-CM

## 2019-02-15 DIAGNOSIS — Z98.890: ICD-10-CM

## 2019-02-15 DIAGNOSIS — R40.20: Primary | ICD-10-CM

## 2019-02-15 LAB
ALBUMIN SERPL-MCNC: 1.3 GM/DL (ref 3.1–4.5)
ALP SERPL-CCNC: 83 U/L (ref 45–117)
ALT SERPL W P-5'-P-CCNC: 7 U/L (ref 12–78)
APPEARANCE UR: (no result)
AST SERPL-CCNC: 13 IU/L (ref 3–35)
BACTERIA #/AREA URNS HPF: (no result) /[HPF]
BASE EXCESS BLDA CALC-SCNC: -7.6 MMOL/L (ref -2–2)
BILIRUB UR QL STRIP: NEGATIVE
BUN SERPL-MCNC: 28 MG/DL (ref 7–24)
BURR CELLS BLD QL SMEAR: (no result)
CHLORIDE SERPL-SCNC: 103 MMOL/L (ref 98–107)
COLOR UR: YELLOW
CREAT SERPL-MCNC: 2.64 MG/DL (ref 0.55–1.02)
ERYTHROCYTE [DISTWIDTH] IN BLOOD BY AUTOMATED COUNT: 24.1 % (ref 0–14.5)
GLUCOSE UR QL: NEGATIVE
HCO3 BLDA-SCNC: 21.2 MMOL/L (ref 22–26)
HCT VFR BLD AUTO: 27.9 % (ref 37–47)
HGB BLD-MCNC: 8.2 G/DL (ref 12–16)
HGB UR QL STRIP: NEGATIVE
KETONES UR QL STRIP: (no result)
LEUKOCYTE ESTERASE UR QL STRIP: NEGATIVE
MACROCYTES BLD QL SMEAR: SLIGHT
MCH RBC QN AUTO: 29.8 PG (ref 27–31)
MCHC RBC AUTO-ENTMCNC: 29.4 G/DL (ref 33–37)
MCV RBC AUTO: 101.5 FL (ref 81–99)
NITRITE UR QL STRIP: NEGATIVE
NRBC BLD QL AUTO: 0.1 10*3/UL (ref 0–0)
PCO2 BLDA: 61.1 MMHG (ref 35–45)
PH BLDA: 7.16 [PH] (ref 7.35–7.45)
PH UR STRIP: 8 [PH] (ref 5–9)
PLATELET # BLD AUTO: 77 10*3/UL (ref 130–400)
PLATELET SUFFICIENCY: (no result)
PMV BLD AUTO: 12.8 FL (ref 9.6–12.3)
PO2 BLDA: 351 MMHG (ref 80–90)
POLYCHROMASIA BLD QL SMEAR: SLIGHT
POTASSIUM SERPL-SCNC: 3.8 MMOL/L (ref 3.5–5.1)
PROT SERPL-MCNC: 5 GM/DL (ref 6.4–8.2)
RBC # BLD AUTO: 2.75 10*6/UL (ref 4.1–5.1)
SAO2 % BLDA: 100 % (ref 95–97)
SCHISTOCYTES BLD QL SMEAR: (no result)
SODIUM SERPL-SCNC: 140 MMOL/L (ref 136–145)
SP GR UR: 1.02 (ref 1–1.03)
TOTAL CELLS COUNTED: 100 #CELLS
TOXIC GRANULES BLD QL SMEAR: (no result)
TROPONIN I SERPL-MCNC: 0.04 NG/ML (ref ?–0.04)
UROBILINOGEN UR STRIP-MCNC: 0.2 E.U./DL (ref 0.2–1)
WBC NRBC COR # BLD AUTO: 6.1 10*3/UL (ref 4.8–10.8)
YEAST #/AREA URNS HPF: (no result) /[HPF]